# Patient Record
Sex: MALE | Race: BLACK OR AFRICAN AMERICAN | NOT HISPANIC OR LATINO | Employment: FULL TIME | URBAN - METROPOLITAN AREA
[De-identification: names, ages, dates, MRNs, and addresses within clinical notes are randomized per-mention and may not be internally consistent; named-entity substitution may affect disease eponyms.]

---

## 2019-11-13 ENCOUNTER — AMBULATORY - HEALTHEAST (OUTPATIENT)
Dept: INTENSIVE CARE | Facility: CLINIC | Age: 34
End: 2019-11-13

## 2019-11-13 DIAGNOSIS — J18.9 COMMUNITY ACQUIRED PNEUMONIA, BILATERAL: ICD-10-CM

## 2019-11-18 ENCOUNTER — OFFICE VISIT - HEALTHEAST (OUTPATIENT)
Dept: FAMILY MEDICINE | Facility: CLINIC | Age: 34
End: 2019-11-18

## 2019-11-18 DIAGNOSIS — J18.9 COMMUNITY ACQUIRED PNEUMONIA, BILATERAL: ICD-10-CM

## 2019-11-18 DIAGNOSIS — M54.50 MIDLINE LOW BACK PAIN WITHOUT SCIATICA, UNSPECIFIED CHRONICITY: ICD-10-CM

## 2019-11-18 DIAGNOSIS — J45.20 MILD INTERMITTENT REACTIVE AIRWAY DISEASE WITHOUT COMPLICATION: ICD-10-CM

## 2019-11-18 DIAGNOSIS — J18.9 PNEUMONIA OF RIGHT LUNG DUE TO INFECTIOUS ORGANISM, UNSPECIFIED PART OF LUNG: ICD-10-CM

## 2019-11-18 DIAGNOSIS — I10 ESSENTIAL HYPERTENSION, BENIGN: ICD-10-CM

## 2019-11-18 RX ORDER — LISINOPRIL 10 MG/1
10 TABLET ORAL DAILY
Qty: 30 TABLET | Refills: 11 | Status: SHIPPED | OUTPATIENT
Start: 2019-11-18

## 2019-11-18 ASSESSMENT — MIFFLIN-ST. JEOR: SCORE: 2016.09

## 2019-11-19 ENCOUNTER — COMMUNICATION - HEALTHEAST (OUTPATIENT)
Dept: FAMILY MEDICINE | Facility: CLINIC | Age: 34
End: 2019-11-19

## 2019-12-06 ENCOUNTER — OFFICE VISIT - HEALTHEAST (OUTPATIENT)
Dept: PULMONOLOGY | Facility: OTHER | Age: 34
End: 2019-12-06

## 2019-12-06 ENCOUNTER — HOSPITAL ENCOUNTER (OUTPATIENT)
Dept: RADIOLOGY | Facility: HOSPITAL | Age: 34
Discharge: HOME | End: 2019-12-06
Attending: INTERNAL MEDICINE

## 2019-12-06 ENCOUNTER — COMMUNICATION - HEALTHEAST (OUTPATIENT)
Dept: PULMONOLOGY | Facility: OTHER | Age: 34
End: 2019-12-06

## 2019-12-06 DIAGNOSIS — J45.30 MILD PERSISTENT REACTIVE AIRWAY DISEASE WITHOUT COMPLICATION: ICD-10-CM

## 2019-12-06 ASSESSMENT — MIFFLIN-ST. JEOR: SCORE: 2014.45

## 2019-12-15 ENCOUNTER — COMMUNICATION - HEALTHEAST (OUTPATIENT)
Dept: PULMONOLOGY | Facility: OTHER | Age: 34
End: 2019-12-15

## 2019-12-17 ENCOUNTER — AMBULATORY - HEALTHEAST (OUTPATIENT)
Dept: PULMONOLOGY | Facility: OTHER | Age: 34
End: 2019-12-17

## 2019-12-17 DIAGNOSIS — B37.0 THRUSH: ICD-10-CM

## 2019-12-19 ENCOUNTER — OFFICE VISIT - HEALTHEAST (OUTPATIENT)
Dept: FAMILY MEDICINE | Facility: CLINIC | Age: 34
End: 2019-12-19

## 2019-12-19 DIAGNOSIS — K12.2 UVULITIS: ICD-10-CM

## 2019-12-19 ASSESSMENT — MIFFLIN-ST. JEOR: SCORE: 2013.54

## 2020-02-06 ENCOUNTER — OFFICE VISIT - HEALTHEAST (OUTPATIENT)
Dept: PULMONOLOGY | Facility: OTHER | Age: 35
End: 2020-02-06

## 2020-02-06 DIAGNOSIS — J45.30 MILD PERSISTENT ASTHMA WITHOUT COMPLICATION: ICD-10-CM

## 2020-02-06 ASSESSMENT — MIFFLIN-ST. JEOR: SCORE: 2035.76

## 2020-03-11 ENCOUNTER — RECORDS - HEALTHEAST (OUTPATIENT)
Dept: ADMINISTRATIVE | Facility: OTHER | Age: 35
End: 2020-03-11

## 2020-03-11 ENCOUNTER — OFFICE VISIT - HEALTHEAST (OUTPATIENT)
Dept: FAMILY MEDICINE | Facility: CLINIC | Age: 35
End: 2020-03-11

## 2020-03-11 DIAGNOSIS — G44.209 ACUTE NON INTRACTABLE TENSION-TYPE HEADACHE: ICD-10-CM

## 2020-03-11 DIAGNOSIS — I10 ESSENTIAL HYPERTENSION: ICD-10-CM

## 2020-03-11 RX ORDER — ERGOTAMINE TARTRATE AND CAFFEINE 1; 100 MG/1; MG/1
TABLET, FILM COATED ORAL
Qty: 100 TABLET | Refills: 0 | Status: SHIPPED | OUTPATIENT
Start: 2020-03-11 | End: 2021-12-17

## 2020-03-11 RX ORDER — AMLODIPINE BESYLATE 5 MG/1
5 TABLET ORAL DAILY
Qty: 30 TABLET | Refills: 0 | Status: SHIPPED | OUTPATIENT
Start: 2020-03-11 | End: 2021-12-17

## 2020-03-12 ENCOUNTER — OFFICE VISIT - HEALTHEAST (OUTPATIENT)
Dept: FAMILY MEDICINE | Facility: CLINIC | Age: 35
End: 2020-03-12

## 2020-03-12 ENCOUNTER — RECORDS - HEALTHEAST (OUTPATIENT)
Dept: ADMINISTRATIVE | Facility: OTHER | Age: 35
End: 2020-03-12

## 2020-03-12 DIAGNOSIS — G44.029 CHRONIC CLUSTER HEADACHE, NOT INTRACTABLE: ICD-10-CM

## 2020-03-12 DIAGNOSIS — G43.909 MIGRAINE WITHOUT STATUS MIGRAINOSUS, NOT INTRACTABLE, UNSPECIFIED MIGRAINE TYPE: ICD-10-CM

## 2020-03-12 RX ORDER — IBUPROFEN 800 MG/1
800 TABLET, FILM COATED ORAL EVERY 8 HOURS PRN
Qty: 60 TABLET | Refills: 2 | Status: SHIPPED | OUTPATIENT
Start: 2020-03-12 | End: 2021-12-17

## 2020-03-12 RX ORDER — HYDROXYZINE HYDROCHLORIDE 25 MG/1
25 TABLET, FILM COATED ORAL EVERY 6 HOURS PRN
Qty: 30 TABLET | Refills: 1 | Status: SHIPPED | OUTPATIENT
Start: 2020-03-12 | End: 2021-12-17

## 2020-03-12 ASSESSMENT — MIFFLIN-ST. JEOR: SCORE: 2024.41

## 2020-03-20 ENCOUNTER — OFFICE VISIT - HEALTHEAST (OUTPATIENT)
Dept: PULMONOLOGY | Facility: OTHER | Age: 35
End: 2020-03-20

## 2020-08-13 ENCOUNTER — OFFICE VISIT - HEALTHEAST (OUTPATIENT)
Dept: FAMILY MEDICINE | Facility: CLINIC | Age: 35
End: 2020-08-13

## 2020-08-13 DIAGNOSIS — R11.0 NAUSEA: ICD-10-CM

## 2020-08-13 DIAGNOSIS — J02.0 STREPTOCOCCAL PHARYNGITIS: ICD-10-CM

## 2020-08-13 LAB — DEPRECATED S PYO AG THROAT QL EIA: ABNORMAL

## 2020-10-17 ENCOUNTER — OFFICE VISIT - HEALTHEAST (OUTPATIENT)
Dept: FAMILY MEDICINE | Facility: CLINIC | Age: 35
End: 2020-10-17

## 2020-10-17 DIAGNOSIS — F17.200 TOBACCO USE DISORDER: ICD-10-CM

## 2020-10-17 DIAGNOSIS — J02.0 STREP PHARYNGITIS: ICD-10-CM

## 2020-10-17 LAB — DEPRECATED S PYO AG THROAT QL EIA: ABNORMAL

## 2020-10-17 RX ORDER — ACETAMINOPHEN 500 MG
1000 TABLET ORAL EVERY 6 HOURS PRN
Qty: 100 TABLET | Refills: 2 | Status: SHIPPED | OUTPATIENT
Start: 2020-10-17 | End: 2021-12-17

## 2021-01-07 ENCOUNTER — OFFICE VISIT - HEALTHEAST (OUTPATIENT)
Dept: FAMILY MEDICINE | Facility: CLINIC | Age: 36
End: 2021-01-07

## 2021-01-07 DIAGNOSIS — M54.50 ACUTE BILATERAL LOW BACK PAIN WITHOUT SCIATICA: ICD-10-CM

## 2021-01-07 DIAGNOSIS — M54.2 NECK PAIN: ICD-10-CM

## 2021-05-17 ENCOUNTER — OFFICE VISIT - HEALTHEAST (OUTPATIENT)
Dept: FAMILY MEDICINE | Facility: CLINIC | Age: 36
End: 2021-05-17

## 2021-05-17 DIAGNOSIS — S46.811A STRAIN OF RIGHT TRAPEZIUS MUSCLE, INITIAL ENCOUNTER: ICD-10-CM

## 2021-05-17 DIAGNOSIS — M54.2 NECK PAIN: ICD-10-CM

## 2021-05-17 RX ORDER — CYCLOBENZAPRINE HCL 5 MG
5 TABLET ORAL 3 TIMES DAILY PRN
Qty: 30 TABLET | Refills: 0 | Status: SHIPPED | OUTPATIENT
Start: 2021-05-17 | End: 2021-12-17

## 2021-06-03 VITALS
DIASTOLIC BLOOD PRESSURE: 80 MMHG | WEIGHT: 220.56 LBS | SYSTOLIC BLOOD PRESSURE: 138 MMHG | HEIGHT: 75 IN | BODY MASS INDEX: 27.42 KG/M2

## 2021-06-03 NOTE — PROGRESS NOTES
Hospital Follow-up Visit:    Assessment/Plan:     1. Pneumonia of right lung due to infectious organism, unspecified part of lung    - predniSONE (DELTASONE) 10 mg tablet; Take 10 mg by mouth daily 3 tablets by mouth daily for 2 days, then take 2 tablets for 2 days, then 1 tablet for 2 days..  Dispense: 12 tablet; Refill: 0  - XR Chest 2 Views  - Ambulatory referral to Pulmonology  Use albuterol every 6 hours, drink plenty of fluids, humidifier at night, Mucinex, shower steam twice daily.  Continue to work on nicotine weaning.    2. Mild intermittent reactive airway disease without complication  Discussed his inhalers, he is using this 1 twice daily and albuterol as his rescue inhaler.  Discussed the use of omeprazole on steroids to reduce risk of ulcers.  - fluticasone propionate (FLOVENT HFA) 44 mcg/actuation inhaler; Inhale 1 puff 2 (two) times a day.  Dispense: 1 Inhaler; Refill: 12  - omeprazole (PRILOSEC) 40 MG capsule; Take 1 capsule (40 mg total) by mouth daily before breakfast.  Dispense: 5 capsule; Refill: 0  - Ambulatory referral to Pulmonology      4. Midline low back pain without sciatica, unspecified chronicity  Continue to use over-the-counter lidocaine patches.  He states that chiropractic adjustments have worked well for him in the past for his low back pain.  - Ambulatory referral to Chiropractic    5. Essential hypertension, benign  We will switch to lisinopril since he did not tolerate it.  States that he may have had an episode of gout while on this medicine.  Follow-up in 1 month to recheck.  - lisinopril (PRINIVIL,ZESTRIL) 10 MG tablet; Take 1 tablet (10 mg total) by mouth daily.  Dispense: 30 tablet; Refill: 11        Subjective:     Blake Degroot is a 34 y.o. male who presents for a hospital discharge follow up.  He states that he is using his albuterol about once daily, using his Flovent twice daily.  He is still having a lot of shortness of breath which is dry.  He has not had fever but  he has had sweats.  He continues to smoke but has reduced his cigarettes down to about 6 daily.  He did not get a pneumonia shot while he was in the hospital.  ROS: Patient denies fever, chills, sweats, fainting, fatigue, weight change, dizziness, sleep problems, chest pain, palpitations, shortness of breath, wheezing, cough,  sore throat, changes in hearing, ear pain,tinnitus,  disphagia, sore throat, globus, changes in vision, eye pain eye redness, acid reflux, nausea, vomiting, diarrhea, constipation, black or bloody stools,  Dysuria, frequency, urinary incontinence, nocturia, hematuria, back pain,joint pain, bone pain, muscle cramps,edema, weakness, numbness, tingling of extremities, rash, itching, skin changes, swollen lymph nodes, thirst, increased urination, breast lumps, breast pain, nipple discharge, memory difficulties, anxiety, mood swings, (female)vaginal discharge, dyspareunia, menorrhagia, pelvic pain, sexual dysfunction,   (male) testicular lumps, erectile dysfunction.        Hospital/Nursing Home/ Rehab Facility: [facility]  Date of Admission: 11/12/19  Date of Discharge: 11/13/2019  Reason(s) for Admission: Pneumonia            Do you have any problems taking your medication regularly?  None       Have you had any changes in your medication since discharge?  Needs to restart blood pressure medication, had been off of hydrochlorothiazide and did have some elevated blood pressures while in the hospital.       Have you had any difficulty following your discharge or treatment plan?  No    Summary of hospitalization:  Hospital discharge summary reviewed  Diagnostic Tests/Treatments reviewed.  Follow up needed: Pulmonology  Other Healthcare Providers Involved in Patient's Care: Patient Care Team:  Maria Alejandra Rich PA-C as PCP - General (Physician Assistant)      Update since discharge: {improved   Information from family, SNF, care coordination: None needed    Post Discharge Medication  "Reconciliation: discharge medications reconciled and changed, per note/orders (see AVS)  Plan of care communicated with: patient and family    Objective:     Vitals:    11/18/19 0835   BP: 138/80   Weight: 220 lb 9 oz (100 kg)   Height: 6' 3\" (1.905 m)         Physical Exam:  Alert, cooperative, well-hydrated.  Appears well.  Eyes: Pupils equal, round, reactive to light.  HEENT: Sclera white, nares patent, MMM   Lungs: Mild expiratory wheezes, scattered rales. No retractions, no increased work of respiration, equal chest rise.   Heart: Regular rate and rhythm, no murmurs, clicks,    Gallops.  Abdomen: Soft, bowel sounds in 4 quadrants with no tenderness to palpation, no organomegaly or masses, no aortic or renal bruits.  Extremities: no tenderness to palpation of gastrocnemius, bilaterally.  Skin: no increased warmth, edema, or erythema of lower legs bilaterally.  Back:  No cervical, thoracic or lumbar tenderness to spinous processes or musculature.        Electronically signed by Maria Alejandra Rich PA-C 11/18/19 12:08 PM   "

## 2021-06-03 NOTE — PATIENT INSTRUCTIONS - HE
Continue to nicotine wean.  Nasal saline.  Mucinex.  2. Humidifier, push fluids, rest,Steam in shower twice daily, 10 mins.  Albuterol every 6 hours.  Follow up with Pulmonology.

## 2021-06-03 NOTE — TELEPHONE ENCOUNTER
----- Message from Maria Alejandra Rich PA-C sent at 11/18/2019 10:26 AM CST -----  CXR is now normal, please call results to the patient or send a letter if not reachable by phone.

## 2021-06-03 NOTE — TELEPHONE ENCOUNTER
Patient Returning Call  Reason for call:  Patient called back.  Information relayed to patient:  Informed of Maria Alejandra Stevens PA-C's message listed below.  Patient states understanding.  Patient has additional questions:  No  If YES, what are your questions/concerns:    Okay to leave a detailed message?: No call back needed

## 2021-06-04 VITALS
SYSTOLIC BLOOD PRESSURE: 124 MMHG | OXYGEN SATURATION: 98 % | WEIGHT: 220.2 LBS | BODY MASS INDEX: 27.38 KG/M2 | RESPIRATION RATE: 24 BRPM | HEART RATE: 92 BPM | HEIGHT: 75 IN | DIASTOLIC BLOOD PRESSURE: 74 MMHG

## 2021-06-04 VITALS
DIASTOLIC BLOOD PRESSURE: 70 MMHG | BODY MASS INDEX: 27.35 KG/M2 | WEIGHT: 220 LBS | HEIGHT: 75 IN | SYSTOLIC BLOOD PRESSURE: 130 MMHG

## 2021-06-04 VITALS — BODY MASS INDEX: 27.02 KG/M2 | WEIGHT: 222 LBS

## 2021-06-04 NOTE — PROGRESS NOTES
CCx:FU Hospitalization after admission for BL Pneumonia    HPI:Mr. Degroot is a 34 y.o.year-old gentleman with a past medical history significant for hypertension, depression and anxiety who presented to the emergency room on 11/12 for evaluation of a worsening cough.  He was seen in the emergency room on 11/10 for similar complaint with sore throat, fever, nausea vomiting accompanied with diarrhea and a headache.  While in the emergency room he was treated with inhaled bronchodilators, administered a one-time dose of Rocephin, steroids, IV fluids and morphine.  It is noted that he was advised hospitalization but declined this and was sent home with a 10-day course of doxycycline.    He represented a day later and was admitted to the hospital because of worsening symptoms and treated with IV antibiotics and steroids with some improvement in his symptoms.  He underwent a follow-up chest x-ray after discharge which demonstrated significant tearing of his infiltrates but today he tells me that he has been feeling unwell again and has documented a fever of 100.5 associated with left-sided chest discomfort and yellow productive cough.  He has had several nighttime awakenings with coughing over the last week.  He states that he is continuing to smoke 5 to 6 cigarettes a day although his girlfriend who is here with him thinks that he smokes about a pack and a half a day and also continues to be compliant with his Flovent and albuterol inhalers.  ROS:  Pertinent positives alluded to in the HPI. Remainder of 10 point ROS is negative.     PMH:  1. HTN  2. Depression  3. Anxiety    Allergies:  Reviewed in epic    Family HX:  No family history on file.    Social Hx:  Social History     Socioeconomic History     Marital status:      Spouse name: None     Number of children: None     Years of education: None     Highest education level: None   Occupational History     None   Social Needs     Financial resource strain: None  "    Food insecurity:     Worry: None     Inability: None     Transportation needs:     Medical: None     Non-medical: None   Tobacco Use     Smoking status: Current Every Day Smoker     Packs/day: 0.00     Smokeless tobacco: Never Used   Substance and Sexual Activity     Alcohol use: Yes     Comment: occasional     Drug use: No     Sexual activity: None   Lifestyle     Physical activity:     Days per week: None     Minutes per session: None     Stress: None   Relationships     Social connections:     Talks on phone: None     Gets together: None     Attends Taoist service: None     Active member of club or organization: None     Attends meetings of clubs or organizations: None     Relationship status: None     Intimate partner violence:     Fear of current or ex partner: None     Emotionally abused: None     Physically abused: None     Forced sexual activity: None   Other Topics Concern     None   Social History Narrative     None     Current Meds:  Current Outpatient Medications   Medication Sig Dispense Refill     albuterol (PROAIR HFA;PROVENTIL HFA;VENTOLIN HFA) 90 mcg/actuation inhaler Inhale 2 puffs every 4 (four) hours as needed for wheezing. 1 Inhaler 0     fluticasone propionate (FLOVENT HFA) 44 mcg/actuation inhaler Inhale 1 puff 2 (two) times a day. 1 Inhaler 12     lisinopril (PRINIVIL,ZESTRIL) 10 MG tablet Take 1 tablet (10 mg total) by mouth daily. 30 tablet 11     No current facility-administered medications for this visit.      Labs:  Reviewed.    Imaging studies:  CXR 11/12/19:  Interval clearing of multifocal bronchopneumonia. Lungs are clear. Lungs are clear. No effusion. Heart and pulmonary vascularity are normal.     Physical Exam:  /74   Pulse 92   Resp 24   Ht 6' 3\" (1.905 m)   Wt 220 lb 3.2 oz (99.9 kg)   SpO2 98% Comment: RA  BMI 27.52 kg/m    Heart Rate: 92  Resp: 24  BP: 124/74  SpO2: 98 % (RA)  Height: 6' 3\" (1.905 m)  Weight: 220 lb 3.2 oz (99.9 kg)  Physical Exam "   Constitutional: He is oriented to person, place, and time. He appears well-developed and well-nourished.   HENT:   Head: Normocephalic.   Poor oral hygiene.   Eyes: Pupils are equal, round, and reactive to light. Conjunctivae are normal.   Neck: Normal range of motion.   Cardiovascular: Normal rate and regular rhythm.   Pulmonary/Chest: Effort normal. He has no wheezes. He has no rales. He exhibits no tenderness.   Diminished BS at the bases BL.   Abdominal: Soft.   Musculoskeletal: Normal range of motion.         General: No edema.   Neurological: He is alert and oriented to person, place, and time.   Skin: Skin is warm.   Psychiatric: He has a normal mood and affect.       Assessment and Plan:Blake Degroot is a 34 y.o. with a past medical history significant for HTN, depression and anxiety who presents to clinic today for follow-up visit after recent hospitalization for bilateral pneumonia with respiratory failure.  Follow-up imaging had significantly cleared, however, he describes having ongoing new fevers, productive cough and pain making wonder if he has had another pneumonia.  For the present we would recommend;    1. ?  Recurrent pneumonia: As above.    We will initiate a 5-day course of doxycycline and prednisone 40 mg daily.    Meprazole 40 mg daily.    Chest x-ray AP and lateral.    Advised that the patient continues fluticasone 2 puffs daily and reminded him to gargle after using this.    For rescue.  I provided him with a spacer.  2. HCM: I spent greater than 5 and less than 10 minutes counseling about tobacco cessation.  3. Follow-up: 2 months.      Radha Diaz  Pulmonary and Critical Care  [id number]

## 2021-06-04 NOTE — PROGRESS NOTES
"Clinic Note   SUBJECTIVE:   Chief Complaint   Patient presents with     Follow-up   Blake Degroot is seen for now 1 month of coughing, congestion, he is also had voice loss in the last 2 weeks.  He continues to have sore throat that is worse with swallowing in the last 2 weeks also.  He is no longer using steroid inhaler is not.  He did see the pulmonologist and was on oral steroids for a time..  His chest x-ray at last check showed asthma only.  He was advised to continue his Flovent and albuterol at his pulmonary appointment but he did not feel the need to continue the Flovent.  He is only using the albuterol occasionally.  He continues to smoke but only 2 cigarettes a day.  He denies any other substance use.  Review of systems: Negative for chest chest pain, positive for rib pain with coughing, shortness of breath, wheezing, negative for fever, nausea, vomiting.  Allergies   Allergen Reactions     Amitriptyline Unknown     Ibuprofen Unknown     Imitrex [Sumatriptan Succinate] Unknown     OBJECTIVE:   /70 (Patient Site: Right Arm, Patient Position: Sitting, Cuff Size: Adult Regular)   Ht 6' 3\" (1.905 m)   Wt 220 lb (99.8 kg)   BMI 27.50 kg/m     General: Alert, oriented, fatigued appearing.  HEENT: Bilateral ear exam showed no inflammation of tympanic membranes or   canals. Nose exam: No discharge. Sinuses nontender on palpation. Oral   exam: The patient had moderate erythema, inflammation of oropharynx.  Uvula is swollen and injected.  NECK: Supple, mildly tender and bilateral anterior cervical lymphadenopathy.   LUNGS: Clear to auscultation.  Equal chest rise, no retractions, light expiratory wheezes heard in upper lobes.  CARDIOVASCULAR: S1, S2, regular rate and rhythm, no murmur.   ASSESSMENT:  1. Acute streptococcal pharyngitis     2. Uvulitis  Ambulatory referral to ENT    fluconazole (DIFLUCAN) 150 MG tablet    clindamycin (CLEOCIN) 300 MG capsule    DISCONTINUED: clindamycin (CLEOCIN) 300 MG " capsule     PLAN:   Maria Alejandra Rich, MS, PA-C

## 2021-06-04 NOTE — PATIENT INSTRUCTIONS - HE
1. Please do not lie down for at least 2 hours after your meals.  2. Please elevate the head end of your bed when you sleep, you can do this by placing a thick book under the head post.  3. Please use your Fluticasone inhaler twice a day every day whether or not you are short of breath, this is not a rescue inhaler so do not use this if you are acutely short of breath.  4. Please be sure to gargle your mouth after using your Fluticasone inhaler.   5. Please use the spacer that I have provided you to use your inhalers.  6. Please use your albuterol inhaler 2 puffs up to 6 times a day for rescue. If you are not short of breath, coughing or wheezing you do not need to use this.  7. I will give you a prescription for Doxycycline and prednisone which I want you to take for 5 days.  8. I will start you on Omeprazole which I would like you to take once a day.  9. I will email/call you with the results of your chest XR.  10. PLEASE STOP SMOKING.

## 2021-06-04 NOTE — PATIENT INSTRUCTIONS - HE
Try to quit smoking, down to 2 cigarettes a day.  Use albuterol up to every 6 hours as needed for wheezing.  Follow-up with ear nose and throat if symptoms do not improve.

## 2021-06-05 NOTE — PROGRESS NOTES
CCx:FU asthma exacerbation    HPI:Mr. Degroot is a 34 y.o.year-old gentleman with a past medical history significant for hypertension, depression and anxiety who presented to the emergency room on 11/12 for evaluation of a worsening cough.    He was last seen by me at the end of December as a hospital follow-up when he had been treated for bacterial pneumonia and with steroids for presumed asthma exacerbation.  He tells me that he has been experiencing on and off fevers and endorses a cough with shortness of breath particularly at night that wakes him up from sleep.  He had been well since the last time he had seen me and describes the symptoms started about 5 days ago fairly abruptly.  He has not been using any inhalers as these ran out.  He has been on lisinopril since November and did not have a cough associated with this and denies any wheezing.  He also endorses ongoing left chest wall pain.    ROS:  Pertinent positives alluded to in the HPI. Remainder of 10 point ROS is negative.     PMH (Unchanged from previous visit):  1. HTN  2. Depression  3. Anxiety    Allergies  (Unchanged from previous visit):  Reviewed in [facility]    Family HX (Unchanged from previous visit):  No family history on file.    Social Hx (Unchanged from previous visit):  Social History     Socioeconomic History     Marital status:      Spouse name: None     Number of children: None     Years of education: None     Highest education level: None   Occupational History     None   Social Needs     Financial resource strain: None     Food insecurity:     Worry: None     Inability: None     Transportation needs:     Medical: None     Non-medical: None   Tobacco Use     Smoking status: Current Every Day Smoker     Packs/day: 0.00     Smokeless tobacco: Never Used   Substance and Sexual Activity     Alcohol use: Yes     Comment: occasional     Drug use: No     Sexual activity: None   Lifestyle     Physical activity:     Days per week: None      "Minutes per session: None     Stress: None   Relationships     Social connections:     Talks on phone: None     Gets together: None     Attends [employer] service: None     Active member of club or organization: None     Attends meetings of clubs or organizations: None     Relationship status: None     Intimate partner violence:     Fear of current or ex partner: None     Emotionally abused: None     Physically abused: None     Forced sexual activity: None   Other Topics Concern     None   Social History Narrative     None     Current Meds:  Current Outpatient Medications   Medication Sig Dispense Refill     albuterol (PROAIR HFA;PROVENTIL HFA;VENTOLIN HFA) 90 mcg/actuation inhaler Inhale 2 puffs every 4 (four) hours as needed for wheezing. 1 Inhaler 0     lisinopril (PRINIVIL,ZESTRIL) 10 MG tablet Take 1 tablet (10 mg total) by mouth daily. 30 tablet 11     No current facility-administered medications for this visit.      Labs:  Reviewed.    Imaging studies (Unchanged from previous visit):  CXR 11/12/19:  Interval clearing of multifocal bronchopneumonia. Lungs are clear. Lungs are clear. No effusion. Heart and pulmonary vascularity are normal.     Physical Exam:  /82   Pulse 76   Temp 98.6  F (37  C)   Resp 18   Ht 6' 3\" (1.905 m)   Wt (!) 226 lb (102.5 kg)   SpO2 100%   BMI 28.25 kg/m    Temp: 98.6  F (37  C)  Heart Rate: 76  Resp: 18  BP: 138/82  SpO2: 100 %  Height: 6' 3\" (1.905 m)  Weight: (!) 226 lb (102.5 kg)  Physical Exam   Constitutional: He is oriented to person, place, and time. He appears well-developed and well-nourished.   HENT:   Head: Normocephalic.   Poor oral hygiene.   Eyes: Pupils are equal, round, and reactive to light. Conjunctivae are normal.   Neck: Normal range of motion.   Cardiovascular: Normal rate and regular rhythm.   Pulmonary/Chest: Effort normal. He has no wheezes. He has no rales. He exhibits no tenderness.   Diminished BS at the bases BL.   Abdominal: Soft. "   Musculoskeletal: Normal range of motion.         General: No edema.   Neurological: He is alert and oriented to person, place, and time.   Skin: Skin is warm.   Psychiatric: He has a normal mood and affect.       Assessment and Plan:Blake Degroot is a 34 y.o. with a past medical history significant for HTN, depression and anxiety who presents to clinic today for follow-up visit for likely asthma exacerbation.  For the present we would recommend;    1. Asthma: Has had 2 courses of prednisone over the last couple of months and states that his shortness of breath and coughing presently does not feel so bad as to warrant initiating a steroid burst.    I reminded him to initiate fluticasone 2 puffs twice a day as I suspect that he will require this for better control of his symptoms.  Been shown proper inhaler technique and instructed to gargle after using this.    Omeprazole 40 mg daily.    As needed albuterol for rescue.   2. HCM:     I spent greater than 5 and less than 10 minutes counseling about tobacco cessation.    Discussed the need for better oral hygiene to prevent lung infections.  3. Follow-up: 2 months.      Radha Diaz  Pulmonary and Critical Care  [id number]

## 2021-06-05 NOTE — PATIENT INSTRUCTIONS - HE
1. Please do not lie down for at least 2 hours after your meals.  2. Please elevate the head end of your bed when you sleep, you can do this by placing a thick book under the head post.  3. Please use your Fluticasone inhaler twice a day every day whether or not you are  short of breath, this is not a rescue inhaler so do not use this if you are acutely short of breath.  4. Please be sure to gargle your mouth after using your Fluticasone inhaler.  5. Please use your albuterol inhaler 2 puffs up to 6 times a day for rescue. If you are not short of breath, you do not need to use this.  6. Please be sure to use your Omeprazole 40mg daily as acid reflux can certainly worsen symptoms of asthma.

## 2021-06-06 NOTE — PROGRESS NOTES
ER Follow-up Visit:    Assessment/Plan:     1. Chronic cluster headache, not intractable       Start Ibuprofen 800 mg with food for any new headache and take hydroxyzine 25 mg with each dose. Follow up in 2 weeks for recheck.         Subjective:     Blake Degroot is a 34 y.o. male who presents for a hospital discharge follow up. He was seen 2 times in the last week in the ER for Migraine headaches. He was given Benadryl and compazine and headaches resolved but then returned the next day. He has a history of severe headaches with nausea and vomiting. He has had daily headaches this week and this is new. Due to this worsening pattern he had a CTA and this showed no evidence of acute hemorrhage or abnormality. He has an unchanged 2 mm outpouching of the supraclinoid ICA since last imaging on 7/17/09. He was advised that he needs a Neurology consult.  He has continued on Norvasc and Propranolol and these are not improving his headaches. He was given a prescription for Cafergot but it was not available at the pharmacy. He is currently not taking any other medication for pain. He can not take Immitrex or Amitriptyline. Ibuprofen causes upset stomach but he can take this with food.       Hospital/Nursing Home/IP Rehab Facility: [facility]  Date of Admission: ER only  Date of Discharge:3/7/20; 3/10/20  Reason(s) for Admission:ER only, Migraine headache, cluster            Do you have any problems taking your medication regularly?  None       Have you had any changes in your medication since discharge? None       Have you had any difficulty following your discharge or treatment plan?  No    Summary of hospitalization:  ER visits reviewed  Diagnostic Tests/Treatments reviewed.  Follow up needed: Medication changes requested bu patient. Still having daily headaches and difficulty sleeping.   Other Healthcare Providers Involved in Patient's Care: Patient Care Team:  Maria Alejandra Rich PA-C as PCP - General  "(Physician Assistant)  Maria Alejandra Rich PA-C as Assigned PCP      Update since discharge:    Information from family, SNF, care coordination: none     Post Discharge Medication Reconciliation: discharge medications reconciled, continue medications without change  Plan of care communicated with: patient    Objective:     Vitals:    03/12/20 1411   BP: 120/70   Weight: 220 lb (99.8 kg)   Height: 6' 4\" (1.93 m)         Physical Exam:  Alert, cooperative, well-hydrated.  Appears well.  Eyes: Pupils equal, round, reactive to light.  HEENT: Sclera white, nares patent, MMM   Lungs: Clear to auscultation. No retractions, no increased work of respiration, equal chest rise.   Heart: Regular rate and rhythm, no murmurs, clicks,    Gallops.            Coding guidelines for this visit:  Type of Medical   Decision Making Face-to-Face Visit       within 7 Days of discharge Face-to-Face Visit        within 14 days of discharge   Moderate Complexity [id number]   High Complexity [id number]       Electronically signed by Maria Alejandra Rich PA-C 03/15/20 2:55 PM     "

## 2021-06-06 NOTE — PROGRESS NOTES
Assessment:   1. Acute non intractable tension-type headache  Discussed diagnosis and treatment options with patient including continuing current medications.   - ergotamine-caffeine (CAFERGOT) 1-100 mg per tablet; Two tablets at onset of attack; then 1 tablet every 30 minutes as needed; maximum: 6 tablets per attack; do not exceed 10 tablets/week  Dispense: 100 tablet; Refill: 0    2. Essential hypertension  Discussed need to properly manage his blood pressure and to take medications as prescribed. Recommend that he follow up with his PCP for blood pressure recheck.   - amLODIPine (NORVASC) 5 MG tablet; Take 1 tablet (5 mg total) by mouth daily.  Dispense: 30 tablet; Refill: 0     Plan:   Continue present treatment and plan.  Lie in darkened room and apply cold packs as needed for pain.  Episodic therapy: ergotamines due to low frequency of pain.  Prophylactic therapy: prophylaxis with beta blockers (no history of asthma) due to high frequency of pain.  Side effect profile discussed in detail.  Asked to keep headache diary.  Avoid alcohol, aspirin and OTC NSAIDs, excessive physical activity, smoking, spicy foods and stressful situations as much as possible.  Referral to Neurology.  Follow up in 7 days with his PCP    Subjective:   Blake Degroot is a 34 y.o. male who presents for evaluation of headache. Symptoms began about 7 days ago. He reports that he use to have headaches in the past and for over a year he did not have any until recently. Generally, the headaches is ongoing and has not stopped since it started. He reports that he was at the ED on 3/7/2020 and yesterday and this morning he has been to the urgency room for the same reason as his headaches has not gotten better. He reports that he was given ketorolac the ED ad it did not help. He reports that he is allergic to Imitrex. He states that nothing has worked and he is scheduled to see a neurologist on 3/23/2020. He had a CT of the head and it was  unremarkable. . Patient has no other concern except that his blood pressure was elevated while he was in the ED. He denied dizziness, and confusion.     The following portions of the patient's history were reviewed and updated as appropriate: allergies, current medications, past family history, past medical history, past social history, past surgical history and problem list.    Review of Systems  A 12 point comprehensive review of systems was negative except as noted.      Objective:   BP (!) 156/95   Pulse 86   Wt (!) 224 lb 4 oz (101.7 kg)   SpO2 100%   BMI 27.30 kg/m    General appearance: alert, appears stated age and cooperative  Head: Normocephalic, without obvious abnormality, atraumatic  Pulses: 2+ and symmetric  Skin: Skin color, texture, turgor normal. No rashes or lesions  Lymph nodes: Cervical, supraclavicular, and axillary nodes normal.  Neurologic: Grossly normal      I reviewed Head CT results performed at the ED and confirmed:   1.  No evidence of proximal large vessel occlusion or flow-limiting stenosis.  2.  Laterally directed 2 mm outpouching arising from the right supraclinoid ICA. This may represent a small infundibulum or aneurysm. It appears essentially stable from 07/17/2009.

## 2021-06-12 NOTE — PROGRESS NOTES
Chief Complaint   Patient presents with     Cough     X1 week. Denies any fever.      Sore Throat     X3 days      Blood pressure 148/89, pulse 95, temperature 98.5  F (36.9  C), temperature source Oral, resp. rate 18, SpO2 98 %.         JULIANO Lozano is a 35 y.o.  male with a PMH significant for:     Patient Active Problem List   Diagnosis     Essential hypertension     Headache     Tobacco use disorder     Community acquired pneumonia, bilateral     Pneumonia     Mild intermittent reactive airway disease without complication     Patient reports nonproductive, dry cough for the past week, and now with sore throat for the past 3 days.  Last evaluated on 8/13/2020 and diagnosed with strep pharyngitis.  Patient denies any fevers, shortness of breath, wheezing, nausea, vomiting, chest pain.  Denies any sick contacts or recent travels.    PMH, Medications and Allergies were reviewed and updated as needed.        REVIEW OF SYSTEMS     Pertinent items are noted in HPI.        OBJECTIVE     Vitals:    10/17/20 0825   BP: 148/89   Patient Site: Right Arm   Patient Position: Sitting   Cuff Size: Adult Regular   Pulse: 95   Resp: 18   Temp: 98.5  F (36.9  C)   TempSrc: Oral   SpO2: 98%     There is no height or weight on file to calculate BMI.    Constitutional: Awake, alert, cooperative, no acute distress, and appears stated age.  Eyes: sclera clear, conjunctiva normal.  ENT: NC/AT, MMM, tonsils mildly erythematous without any abscess or trismus appreciated  Neck: Supple, symmetrical, trachea midline, tenderness to palpation at the right submandibular lymph nodes  Back: Symmetric, no curvature  Lungs: No increased WOB, CTABL, no crackles or wheezing appreciated.  Cardiovascular: Appears well perfused, RRR, normal S1 and S2, no S3 or S4, and no murmur appreciated.  Neurologic: Cranial nerves II-XII are grossly intact.  Sensory is intact. Gait is normal.  Neuropsychiatric: Normal affect, mood, orientation, memory  and insight.  Skin: No visible rashes, erythema, pallor, petechia or purpura.    Pertinent Labs  Recent Results (from the past 24 hour(s))   Rapid Strep A Screen-Throat swab    Specimen: Throat   Result Value Ref Range    Rapid Strep A Antigen Group A Strep detected (!) No Group A Strep detected, presumptive negative     Lab Results: personally reviewed.       ASSESSMENT AND PLAN     Blake was seen today for cough and sore throat.    Diagnoses and all orders for this visit:    Strep pharyngitis  -     Rapid Strep A Screen-Throat swab  -     acetaminophen (TYLENOL EXTRA STRENGTH) 500 MG tablet; Take 2 tablets (1,000 mg total) by mouth every 6 (six) hours as needed for pain.  -     amoxicillin (AMOXIL) 500 MG capsule; Take 1 capsule (500 mg total) by mouth 2 (two) times a day for 10 days.    Tobacco use disorder  Patient not interested in quitting smoking at this time.      Ismael Snowden  10/17/2020

## 2021-06-14 NOTE — PROGRESS NOTES
URGENT CARE VISIT:    SUBJECTIVE:   Blake Degroot is a 35 y.o. male who presents for neck and back pain for 1 day. A heavy and big metal shelf fell on him and his coworker. He does not want to file as work comp. Pain is constant and sharp. Symptoms have been stable since onset. Denies n/t or incontinence. Treatments tried include ice with some relief of symptoms.    PMH:   Past Medical History:   Diagnosis Date     Anxiety      Depression      Hypertension      Allergies: Amitriptyline, Ibuprofen, and Imitrex [sumatriptan succinate]  Medications:   Current Outpatient Medications   Medication Sig Dispense Refill     acetaminophen (TYLENOL EXTRA STRENGTH) 500 MG tablet Take 2 tablets (1,000 mg total) by mouth every 6 (six) hours as needed for pain. 100 tablet 2     albuterol (PROAIR HFA;PROVENTIL HFA;VENTOLIN HFA) 90 mcg/actuation inhaler Inhale 2 puffs every 4 (four) hours as needed for wheezing. 1 Inhaler 0     amLODIPine (NORVASC) 5 MG tablet Take 1 tablet (5 mg total) by mouth daily. 30 tablet 0     cyclobenzaprine (FLEXERIL) 10 MG tablet Take 1 tablet (10 mg total) by mouth at bedtime as needed for muscle spasms. 5 tablet 0     ergotamine-caffeine (CAFERGOT) 1-100 mg per tablet Two tablets at onset of attack; then 1 tablet every 30 minutes as needed; maximum: 6 tablets per attack; do not exceed 10 tablets/week 100 tablet 0     fluticasone propionate (FLOVENT HFA) 44 mcg/actuation inhaler Inhale 1 puff 2 (two) times a day. 1 Inhaler 12     hydrOXYzine HCL (ATARAX) 25 MG tablet Take 1 tablet (25 mg total) by mouth every 6 (six) hours as needed for itching. 30 tablet 1     ibuprofen (ADVIL,MOTRIN) 800 MG tablet Take 1 tablet (800 mg total) by mouth every 8 (eight) hours as needed for pain. 60 tablet 2     lisinopril (PRINIVIL,ZESTRIL) 10 MG tablet Take 1 tablet (10 mg total) by mouth daily. 30 tablet 11     propranoloL (INDERAL) 20 MG tablet Take 1 tablet (20 mg total) by mouth 2 (two) times a day. 60 tablet 0      No current facility-administered medications for this visit.      Social History:   Social History     Tobacco Use     Smoking status: Current Every Day Smoker     Packs/day: 0.00     Smokeless tobacco: Never Used   Substance Use Topics     Alcohol use: Yes     Comment: occasional       ROS: ROS otherwise found to be negative except as noted above.     OBJECTIVE:  /82 (Patient Site: Right Arm, Patient Position: Sitting, Cuff Size: Adult Regular)   Pulse 86   Temp 98.8  F (37.1  C) (Oral)   Resp 16   Wt 210 lb 3.2 oz (95.3 kg)   SpO2 100%   BMI 25.59 kg/m    General: WDWN in NAD  Eyes: EOMI,  PERRL, conjunctiva clear  Neck: Supple, mild paracervical TTP. FROM. Pain with lateral bending.   Cardiac: RRR without murmurs, rubs, or gallops.  Respiratory: LCTAB without adventitious sounds. Non-labored breathing.  Extremities: No peripheral edema or tenderness, peripheral pulses normal  Musculoskeletal: Gait normal. No gross deformities noted, diffuse mild TTP over T3 to 5 and L1 to L3. FROM. Pain with bending. Negative straight leg test. Able to walk on heels and toes. Mild TTP over right lateral hip. FROM right hip.   Neuro: 5/5 bilateral hip strength   Integumentary: No suspicious rashes or lesions.     ASSESSMENT:   1. Acute bilateral low back pain without sciatica  cyclobenzaprine (FLEXERIL) 10 MG tablet   2. Neck pain          PLAN:  Patient Instructions   Patient was educated on the natural course of injury.  Take medication as directed. Side effects may include drowsiness. Conservative measures discussed including rest, ice, and over-the-counter analgesics (Tylenol or Ibuprofen) as needed. See your primary care provider if symptoms worsen or do not improve in 7 days. Seek emergency care if you develop severe pain/swelling, inability to move extremity, skin paleness, or weakness.     Patient verbalized understanding and is agreeable to plan. The patient was discharged ambulatory and in stable  condition.    Arlene Galindo ....................  1/7/2021   4:25 PM

## 2021-06-14 NOTE — PATIENT INSTRUCTIONS - HE
Patient was educated on the natural course of injury.  Take medication as directed. Side effects may include drowsiness. Conservative measures discussed including rest, ice, and over-the-counter analgesics (Tylenol or Ibuprofen) as needed. See your primary care provider if symptoms worsen or do not improve in 7 days. Seek emergency care if you develop severe pain/swelling, inability to move extremity, skin paleness, or weakness.

## 2021-06-15 ENCOUNTER — OFFICE VISIT - HEALTHEAST (OUTPATIENT)
Dept: FAMILY MEDICINE | Facility: CLINIC | Age: 36
End: 2021-06-15

## 2021-06-15 DIAGNOSIS — L98.9 SKIN LESION: ICD-10-CM

## 2021-06-15 ASSESSMENT — MIFFLIN-ST. JEOR: SCORE: 1977.69

## 2021-06-16 PROBLEM — J18.9 COMMUNITY ACQUIRED PNEUMONIA, BILATERAL: Status: ACTIVE | Noted: 2019-11-12

## 2021-06-16 PROBLEM — J18.9 PNEUMONIA: Status: ACTIVE | Noted: 2019-11-12

## 2021-06-18 NOTE — PATIENT INSTRUCTIONS - HE
Patient Instructions by Ismael Snowden MD at 10/17/2020  8:20 AM     Author: Ismael Snowden MD Service: -- Author Type: Physician    Filed: 10/17/2020  8:51 AM Encounter Date: 10/17/2020 Status: Signed    : Ismael Snowden MD (Physician)         Patient Education     Pharyngitis: Strep (Confirmed)    You have had a positive test for strep throat. Strep throat is a contagious illness. It is spread by coughing, kissing or by touching others after touching your mouth or nose. Symptoms include throat pain that is worse with swallowing, aching all over, headache, and fever. It is treated with antibiotic medicine. This should help you start to feel better in 1 to 2 days.  Home care    Rest at home. Drink plenty of fluids to you won't get dehydrated.    No work or school for the first 2 days of taking the antibiotics. After this time, you will not be contagious. You can then return to school or work if you are feeling better.     Take antibiotic medicine for the full 10 days, even if you feel better. This is very important to ensure the infection is treated. It is also important to prevent medicine-resistant germs from developing. If you were given an antibiotic shot, you don't need any more antibiotics.    You may use acetaminophen or ibuprofen to control pain or fever, unless another medicine was prescribed for this. Talk with your healthcare provider before taking these medicines if you have chronic liver or kidney disease. Also talk with your healthcare provider if you have had a stomach ulcer or GI bleeding.    Throat lozenges or sprays help reduce pain. Gargling with warm saltwater will also reduce throat pain. Dissolve 1/2 teaspoon of salt in 1 glass of warm water. This may be useful just before meals.     Soft foods are OK. Don't eat salty or spicy foods.  Follow-up care  Follow up with your healthcare provider or our staff if you don't get better over the next week.  When to seek medical advice  Call your healthcare  provider right away if any of these occur:    Fever of 100.4 F (38 C) or higher, or as directed by your healthcare provider    New or worsening ear pain, sinus pain, or headache    Painful lumps in the back of neck    Stiff neck    Lymph nodes getting larger or becoming soft in the middle    You can't swallow liquids or you can't open your mouth wide because of throat pain    Signs of dehydration. These include very dark urine or no urine, sunken eyes, and dizziness.    Trouble breathing or noisy breathing    Muffled voice    Rash  Prevention  Here are steps you can take to help prevent an infection:    Keep good hand washing habits.    Dont have close contact with people who have sore throats, colds, or other upper respiratory infections.    Dont smoke, and stay away from secondhand smoke.  Date Last Reviewed: 11/1/2017 2000-2017 [employer]. [address] [place] [address], [place] [address]. All rights reserved. This information is not intended as a substitute for professional medical care. Always follow your healthcare professional's instructions.

## 2021-06-18 NOTE — PATIENT INSTRUCTIONS - HE
Patient Instructions by Monae Mcclure CNP at 5/17/2021  7:00 PM     Author: Monae Mcclure CNP Service: -- Author Type: Nurse Practitioner    Filed: 5/17/2021  7:23 PM Encounter Date: 5/17/2021 Status: Addendum    : Monae Mcclure CNP (Nurse Practitioner)    Related Notes: Original Note by Monae Mcclure CNP (Nurse Practitioner) filed at 5/17/2021  7:22 PM       Ibuprofen 600 mg 4 times daily with food    Cyclobenzaprine (flexeril) for muscle relaxer 10 mg at night (can take 5 mg twice daily as well if not having to drive/work).      Recheck on Friday - new provider     Warm packs to your neck     Consider trigger point injection later this week.  Consider asking about physical therapy.      You have a trapezius muscle strain most likely shooting down your neck      Patient Education     Head Tilt / Upper Trapezius Stretch (Flexibility)    1. Sit up straight in a chair with your head and neck in a neutral position, ears in line with shoulders. Hold the edge of your chair seat with your right hand. Tuck your chin in slightly.  2. Tilt your head to the left, while looking straight ahead.  3. Put your left hand on the right side of your head. Gently pull your head to the left. Hold for 30 to 60 seconds. Use gentle pressure to increase the stretch. Dont force your head into position.  4. Return your head and neck to the neutral position.  5. Repeat this exercise 2 times, or as instructed.  6. Switch sides and repeat 2 times, or as instructed.  Challenge yourself  Tuck one end of a towel under your left arm. Then bring the other end over your right shoulder. Pull the towel down on your right shoulder with both hands as you side-bend your head to the left. Repeat with the other side.   Date Last Reviewed: 3/10/2016    6760-4686 [employer]. [address] [facility], [place] [address]. All rights reserved. This information is not intended as a substitute for professional medical care. Always  follow your healthcare professional's instructions.

## 2021-06-18 NOTE — PATIENT INSTRUCTIONS - HE
Patient Instructions by Monae Mcclure CNP at 8/13/2020  3:20 PM     Author: Monae Mcclure CNP Service: -- Author Type: Nurse Practitioner    Filed: 8/13/2020  4:10 PM Encounter Date: 8/13/2020 Status: Addendum    : Monae Mcclure CNP (Nurse Practitioner)    Related Notes: Original Note by Mnoae Mcclure CNP (Nurse Practitioner) filed at 8/13/2020  4:09 PM       Throw away toothbrush after 24 hours.  No work/school for at least 24 hours following start of treatment or for 24 hours after temperature less than 100.4 F.  Push fluids.  Ibuprofen or Tylenol for pain as directed on package.  Return to clinic if not feeling much better in 2 or 3 days or new symptoms develop.        Patient Education     Pharyngitis: Strep (Confirmed)    You have had a positive test for strep throat. Strep throat is a contagious illness. It is spread by coughing, kissing or by touching others after touching your mouth or nose. Symptoms include throat pain that is worse with swallowing, aching all over, headache, and fever. It is treated with antibiotic medicine. This should help you start to feel better in 1 to 2 days.  Home care    Rest at home. Drink plenty of fluids to you won't get dehydrated.    No work or school for the first 2 days of taking the antibiotics. After this time, you will not be contagious. You can then return to school or work if you are feeling better.     Take antibiotic medicine for the full 10 days, even if you feel better. This is very important to ensure the infection is treated. It is also important to prevent medicine-resistant germs from developing. If you were given an antibiotic shot, you don't need any more antibiotics.    You may use acetaminophen or ibuprofen to control pain or fever, unless another medicine was prescribed for this. Talk with your healthcare provider before taking these medicines if you have chronic liver or kidney disease. Also talk with your healthcare provider if you  have had a stomach ulcer or GI bleeding.    Throat lozenges or sprays help reduce pain. Gargling with warm saltwater will also reduce throat pain. Dissolve 1/2 teaspoon of salt in 1 glass of warm water. This may be useful just before meals.     Soft foods are OK. Don't eat salty or spicy foods.  Follow-up care  Follow up with your healthcare provider or our staff if you don't get better over the next week.  When to seek medical advice  Call your healthcare provider right away if any of these occur:    Fever of 100.4 F (38 C) or higher, or as directed by your healthcare provider    New or worsening ear pain, sinus pain, or headache    Painful lumps in the back of neck    Stiff neck    Lymph nodes getting larger or becoming soft in the middle    You can't swallow liquids or you can't open your mouth wide because of throat pain    Signs of dehydration. These include very dark urine or no urine, sunken eyes, and dizziness.    Trouble breathing or noisy breathing    Muffled voice    Rash  Prevention  Here are steps you can take to help prevent an infection:    Keep good hand washing habits.    Dont have close contact with people who have sore throats, colds, or other upper respiratory infections.    Dont smoke, and stay away from secondhand smoke.  Date Last Reviewed: 11/1/2017 2000-2017 [employer]. [address] [place] [address], [place] [address]. All rights reserved. This information is not intended as a substitute for professional medical care. Always follow your healthcare professional's instructions.

## 2021-06-19 NOTE — LETTER
Letter by Maria Alejandra Rich PA-C at      Author: Maria Alejandra Rich PA-C Service: -- Author Type: --    Filed:  Encounter Date: 11/19/2019 Status: Signed         Blake Degroot  [address] [address]  [place] [address]             November 19, 2019         Dear Mr. Degroot,    Below are the results from your recent visit:       CXR is now normal      Please call with questions or contact us using [employer].    Sincerely,        Electronically signed by Maria Alejandra Rich PA-C

## 2021-06-20 NOTE — LETTER
Letter by Maria Alejandra Rich PA-C at      Author: Maria Alejandra Rich PA-C Service: -- Author Type: --    Filed:  Encounter Date: 12/19/2019 Status: Signed         December 19, 2019     Patient: Blake Degroot   YOB: 1985   Date of Visit: 12/19/2019       To Whom it May Concern:    Blake Degroot was seen in my clinic on 12/19/2019. He has been ill with a serious bacterial pneumonia and now seen today for an additional throat infection. He was off work from 12/9/19 to 12/22/19 and may return to work on 12/23/19,     If you have any questions or concerns, please don't hesitate to call.    Sincerely,         Electronically signed by Maria Alejandra Rich PA-C

## 2021-06-20 NOTE — LETTER
Letter by Monae Mcclure CNP at      Author: Monae Mcclure CNP Service: -- Author Type: --    Filed:  Encounter Date: 8/13/2020 Status: (Other)         August 13, 2020     Patient: Blake Degroot   YOB: 1985   Date of Visit: 8/13/2020       To Whom It May Concern:    It is my medical opinion that Blake Degroot may return to work on 8/15/2020.  He came to my clinic today feeling ill and was found to have strep throat.    If you have any questions or concerns, please don't hesitate to call.    Sincerely,        Electronically signed by Monae Mcclure CNP

## 2021-06-21 NOTE — LETTER
Letter by Arlene Galindo PA-C at      Author: Arlene Galindo PA-C Service: -- Author Type: --    Filed:  Encounter Date: 1/7/2021 Status: (Other)         January 7, 2021     Patient: Blake Degroot   YOB: 1985   Date of Visit: 1/7/2021       To Whom it May Concern:    Blake Degroot was seen in my clinic on 1/7/2021. Recommend resting at home for 1 to 3 days.     If you have any questions or concerns, please don't hesitate to call.    Sincerely,         Electronically signed by Arlene Galindo PA-C

## 2021-06-21 NOTE — LETTER
Letter by Monae Mcclure CNP at      Author: Monae Mcclure CNP Service: -- Author Type: --    Filed:  Encounter Date: 5/17/2021 Status: (Other)         May 17, 2021     Patient: Blake Degroot   YOB: 1985   Date of Visit: 5/17/2021       To Whom it May Concern:    Blake Degroot was seen in my clinic on 5/17/2021.  For work related injury: Light duty - no lifting greater than 10 lb and no overhead reaching for 4 days and then recheck in our clinic.     If you have any questions or concerns, please don't hesitate to call.    Sincerely,         Electronically signed by Monae Mcclure CNP

## 2021-06-26 NOTE — PROGRESS NOTES
"  Assessment and Plan     35 year old with new skin lesion on left upper chest of unknown etiology.  I recommend referral to dermatology for further work-up and consideration of biopsy.    1. Skin lesion  - Ambulatory referral to Dermatology - [facility] [place]    Follow up: PRN  Options for treatment and follow-up care were reviewed with the patient and/or guardian. Blake Degroot and/or guardian engaged in the decision making process and verbalized understanding of the options discussed and agreed with the final plan.    Dr. Krishna Cortes MD         HPI:   Blake Degroot is a 35 y.o.  male with problems as below, who presents for:    Chief Complaint   Patient presents with     Lesion/marking left side of chest     noticed a couple days ago     Jasper states that he was taking a shower last night when his wife noticed a lesion on his left chest.  He had not noticed this before.  He describes it as a \"skin tag\".  To me it appears more like a burn marla but he states there was no trauma to the area that he knows of.  Has always appeared like this since he noticed that last night.  Never vesicular changes.  Mild pain with it.  Area of redness around the central lesion that wife has marked with marker.  No other lesions on his body that he knows of.         PMHX:     Patient Active Problem List   Diagnosis     Essential hypertension     Headache     Tobacco use disorder     Community acquired pneumonia, bilateral     Pneumonia     Mild intermittent reactive airway disease without complication     Depression with anxiety       Current Outpatient Medications   Medication Sig Dispense Refill     acetaminophen (TYLENOL EXTRA STRENGTH) 500 MG tablet Take 2 tablets (1,000 mg total) by mouth every 6 (six) hours as needed for pain. 100 tablet 2     albuterol (PROAIR HFA;PROVENTIL HFA;VENTOLIN HFA) 90 mcg/actuation inhaler Inhale 2 puffs every 4 (four) hours as needed for wheezing. 1 Inhaler 0     amLODIPine " "(NORVASC) 5 MG tablet Take 1 tablet (5 mg total) by mouth daily. 30 tablet 0     cyclobenzaprine (FLEXERIL) 5 MG tablet Take 1 tablet (5 mg total) by mouth 3 (three) times a day as needed for muscle spasms. May take 1-2 tablets before bed. 30 tablet 0     ergotamine-caffeine (CAFERGOT) 1-100 mg per tablet Two tablets at onset of attack; then 1 tablet every 30 minutes as needed; maximum: 6 tablets per attack; do not exceed 10 tablets/week 100 tablet 0     fluticasone propionate (FLOVENT HFA) 44 mcg/actuation inhaler Inhale 1 puff 2 (two) times a day. 1 Inhaler 12     hydrOXYzine HCL (ATARAX) 25 MG tablet Take 1 tablet (25 mg total) by mouth every 6 (six) hours as needed for itching. 30 tablet 1     ibuprofen (ADVIL,MOTRIN) 800 MG tablet Take 1 tablet (800 mg total) by mouth every 8 (eight) hours as needed for pain. 60 tablet 2     lisinopril (PRINIVIL,ZESTRIL) 10 MG tablet Take 1 tablet (10 mg total) by mouth daily. 30 tablet 11     propranoloL (INDERAL) 20 MG tablet Take 1 tablet (20 mg total) by mouth 2 (two) times a day. 60 tablet 0     No current facility-administered medications for this visit.        Social History     Tobacco Use     Smoking status: Current Every Day Smoker     Packs/day: 0.00     Smokeless tobacco: Never Used   Substance Use Topics     Alcohol use: Yes     Comment: occasional     Drug use: No       Social History     Social History Narrative     Not on file       Allergies   Allergen Reactions     Amitriptyline Unknown     \"Was tired\"      Ibuprofen Unknown     Upset stomach if he has not eaten.     Imitrex [Sumatriptan Succinate] Unknown              Review of Systems:    Complete ROS is negative except as noted in the HPI         Physical Exam:   /64   Pulse 82   Temp 98.6  F (37  C) (Tympanic)   Resp 20   Ht 6' 4\" (1.93 m)   Wt 209 lb 11.2 oz (95.1 kg)   SpO2 98%   BMI 25.53 kg/m      General appearance: Alert, cooperative, no distress, appears stated age  Head: Normocephalic, " atraumatic, without obvious abnormality  Eyes: Pupils equal round, reactive.  Conjunctiva clear.  Neck: Supple, symmetric, trachea midline  Lungs: Clear to auscultation bilaterally, no wheezing or crackles present.  Respirations unlabored  Heart: Regular rate and rhythm, normal S1 and S2, no murmur, rub or gallop.  Skin: melanocytic appearing macule over left upper chest wall approximately 2 cm in diameter with surrounding mild erythema, no swelling, no discharge

## 2021-06-27 ENCOUNTER — HEALTH MAINTENANCE LETTER (OUTPATIENT)
Age: 36
End: 2021-06-27

## 2021-06-29 NOTE — PROGRESS NOTES
Progress Notes by Monae Mcclure CNP at 8/13/2020  3:20 PM     Author: Monae Mcclure CNP Service: -- Author Type: Nurse Practitioner    Filed: 8/13/2020  4:10 PM Encounter Date: 8/13/2020 Status: Signed    : Monae Mcclure CNP (Nurse Practitioner)       Chief Complaint   Patient presents with   ? Dizziness     x 1 dAY   ? Nausea   ? Emesis       ASSESSMENT & PLAN:   Diagnoses and all orders for this visit:    Streptococcal pharyngitis  -     amoxicillin (AMOXIL) 500 MG tablet; Take 2 tablets (1,000 mg total) by mouth daily for 10 days.  Dispense: 20 tablet; Refill: 0    Nausea  -     Rapid Strep A Screen-Throat  -     ondansetron disintegrating tablet 4 mg (ZOFRAN-ODT)        MDM:  Patient with vague malaise with lightheadedness and vomiting.  Throat is quite red despite lack of sore throat.  Patient found to have strep throat.    Work/school note provided.  Discussed throwing away toothbrush after 24 hours.  No work/school for at least 24 hours following start of treatment.  Push fluids.  Ibuprofen or Tylenol for pain as directed on package.  Return to clinic if not feeling much better in 2 or 3 days or new symptoms develop.            Supportive care discussed.  See discharge instructions below for specific recommendations given.    At the end of the encounter, I discussed results, diagnosis, medications. Discussed red flags for immediate return to clinic/ER, as well as indications for follow up if no improvement. Patient and/or caregiver understood and agreed to plan. Patient was stable for discharge.    SUBJECTIVE    HPI:  HPI  Blake Degroot presents to the walk-in clinic with   Chief Complaint   Patient presents with   ? Dizziness     x 1 dAY   ? Nausea   ? Emesis     Vomiting x 2 yesterday and once today.      Associated with: Nausea, lightheaded.      Had covid exposure at work, tested negative 2 weeks ago.  Has been longer than 14 days since exposure.    Works in a warehouse.  Was in a  crowded restaurant recently.      Denies: diarrhea, abd pain     See ROS for additional symptoms and/or pertinent negatives.       History obtained from the patient.    Past Medical History:   Diagnosis Date   ? Anxiety    ? Depression    ? Hypertension        Active Ambulatory (Non-Hospital) Problems    Diagnosis   ? Community acquired pneumonia, bilateral   ? Pneumonia   ? Mild intermittent reactive airway disease without complication   ? Tobacco use disorder   ? Essential hypertension   ? Headache       No family history on file.    Social History     Tobacco Use   ? Smoking status: Current Every Day Smoker     Packs/day: 0.00   ? Smokeless tobacco: Never Used   Substance Use Topics   ? Alcohol use: Yes     Comment: occasional       Review of Systems   Constitutional: Negative for fever.   HENT: Negative for congestion, ear pain, sinus pain and sore throat.    Respiratory: Negative for cough.    Gastrointestinal: Positive for nausea and vomiting. Negative for abdominal pain and diarrhea.   Neurological: Positive for light-headedness. Negative for headaches.       OBJECTIVE    Vitals:    08/13/20 1519   BP: 147/89   Pulse: 91   Temp: 98.9  F (37.2  C)   TempSrc: Oral   SpO2: 99%       Physical Exam  Constitutional:       General: He is not in acute distress.     Appearance: He is well-developed.   HENT:      Right Ear: External ear normal.      Left Ear: External ear normal.      Nose: No congestion or rhinorrhea.      Mouth/Throat:      Pharynx: Posterior oropharyngeal erythema present.      Tonsils: 2+ on the right. 2+ on the left.   Eyes:      General:         Right eye: No discharge.         Left eye: No discharge.      Conjunctiva/sclera: Conjunctivae normal.   Pulmonary:      Effort: Pulmonary effort is normal.   Musculoskeletal: Normal range of motion.   Skin:     General: Skin is warm and dry.      Capillary Refill: Capillary refill takes less than 2 seconds.   Neurological:      Mental Status: He is alert  and oriented to person, place, and time.   Psychiatric:         Behavior: Behavior normal.         Thought Content: Thought content normal.         Judgment: Judgment normal.         Labs:  Recent Results (from the past 240 hour(s))   Rapid Strep A Screen-Throat    Specimen: Throat   Result Value Ref Range    Rapid Strep A Antigen Group A Strep detected (!) No Group A Strep detected, presumptive negative         Radiology:    No results found.    PATIENT INSTRUCTIONS:   Patient Instructions   Throw away toothbrush after 24 hours.  No work/school for at least 24 hours following start of treatment or for 24 hours after temperature less than 100.4 F.  Push fluids.  Ibuprofen or Tylenol for pain as directed on package.  Return to clinic if not feeling much better in 2 or 3 days or new symptoms develop.        Patient Education     Pharyngitis: Strep (Confirmed)    You have had a positive test for strep throat. Strep throat is a contagious illness. It is spread by coughing, kissing or by touching others after touching your mouth or nose. Symptoms include throat pain that is worse with swallowing, aching all over, headache, and fever. It is treated with antibiotic medicine. This should help you start to feel better in 1 to 2 days.  Home care    Rest at home. Drink plenty of fluids to you won't get dehydrated.    No work or school for the first 2 days of taking the antibiotics. After this time, you will not be contagious. You can then return to school or work if you are feeling better.     Take antibiotic medicine for the full 10 days, even if you feel better. This is very important to ensure the infection is treated. It is also important to prevent medicine-resistant germs from developing. If you were given an antibiotic shot, you don't need any more antibiotics.    You may use acetaminophen or ibuprofen to control pain or fever, unless another medicine was prescribed for this. Talk with your healthcare provider before  taking these medicines if you have chronic liver or kidney disease. Also talk with your healthcare provider if you have had a stomach ulcer or GI bleeding.    Throat lozenges or sprays help reduce pain. Gargling with warm saltwater will also reduce throat pain. Dissolve 1/2 teaspoon of salt in 1 glass of warm water. This may be useful just before meals.     Soft foods are OK. Don't eat salty or spicy foods.  Follow-up care  Follow up with your healthcare provider or our staff if you don't get better over the next week.  When to seek medical advice  Call your healthcare provider right away if any of these occur:    Fever of 100.4 F (38 C) or higher, or as directed by your healthcare provider    New or worsening ear pain, sinus pain, or headache    Painful lumps in the back of neck    Stiff neck    Lymph nodes getting larger or becoming soft in the middle    You can't swallow liquids or you can't open your mouth wide because of throat pain    Signs of dehydration. These include very dark urine or no urine, sunken eyes, and dizziness.    Trouble breathing or noisy breathing    Muffled voice    Rash  Prevention  Here are steps you can take to help prevent an infection:    Keep good hand washing habits.    Dont have close contact with people who have sore throats, colds, or other upper respiratory infections.    Dont smoke, and stay away from secondhand smoke.  Date Last Reviewed: 11/1/2017 2000-2017 [employer]. [address] [place] [address], [place] [address]. All rights reserved. This information is not intended as a substitute for professional medical care. Always follow your healthcare professional's instructions.

## 2021-06-30 NOTE — PROGRESS NOTES
Progress Notes by Monae Mcclure CNP at 5/17/2021  7:00 PM     Author: Monae Mcclure CNP Service: -- Author Type: Nurse Practitioner    Filed: 5/17/2021  7:40 PM Encounter Date: 5/17/2021 Status: Signed    : Monae Mcclure CNP (Nurse Practitioner)       Chief Complaint   Patient presents with   ? Neck Pain     x1-2wks, pain starts in R side of neck and shoots down R arm, sometimes arm goes numb       ASSESSMENT & PLAN:   Diagnoses and all orders for this visit:    Neck pain  -     cyclobenzaprine (FLEXERIL) 5 MG tablet; Take 1 tablet (5 mg total) by mouth 3 (three) times a day as needed for muscle spasms. May take 1-2 tablets before bed.  Dispense: 30 tablet; Refill: 0    Strain of right trapezius muscle, initial encounter      Patient with right paracervical muscle pain right trapezius muscle pain and tenderness that intermittently radiates down the right arm.  Differential includes cervical radiculopathy, trapezius muscle strain, deltoid muscle strain, torticollis.    Due to heavy lifting at his job, exam findings and age, most likely more of a trapezius origin of pain.  Ibuprofen scheduled 4 times daily, cyclobenzaprine, warm packs, work restriction for 4 days due to hesitancy of not being at work, muscle relaxer.  Did offer trigger point injections, but patient would prefer to try rest and medication first.  Patient's PCP left our clinic, so he says he will follow up with another PCP later this week.  No appreciable muscle weakness today.    I think he would be appropriate for trigger point injections and/or physical therapy to increase mobility.     Supportive care discussed.  See discharge instructions below for specific recommendations given.    At the end of the encounter, I discussed results, diagnosis, medications with the patient and/or caregivers. Discussed red flags for immediate return to clinic/ER, as well as indications for follow up if no improvement. Patient and/or caregiver  understood and agreed to plan. Patient was stable for discharge.    15 minutes spent on the date of the encounter doing chart review, documentation and discussion with other provider(s)       SUBJECTIVE    HPI:  HPI  Blake Degroot presents to the walk-in clinic with   Chief Complaint   Patient presents with   ? Neck Pain     x1-2wks, pain starts in R side of neck and shoots down R arm, sometimes arm goes numb     Rt lateral neck pain radiating down shoulder to fingertips on radial side..  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  Thinks this is related to work, but is worried about using work comp because he thinks he will get in trouble.    Worse at night when sleeping.  Can only sleep in one position.    Hurts with moving arm in any type of heavy lifting.  Symptoms have been present for 2 weeks.  Sometime last week for same and had to leave work early today due to severity of symptoms.  Has not been seen by any provider for this.      See ROS for additional symptoms and/or pertinent negatives.       History obtained from the patient.      Review of Systems    Denies other issues except as above.    OBJECTIVE    Vitals:    05/17/21 1909   BP: 155/87   Patient Site: Right Arm   Patient Position: Sitting   Cuff Size: Adult Regular   Pulse: 83   Resp: 16   Temp: 99.7  F (37.6  C)   TempSrc: Oral   SpO2: 99%       Physical Exam  Constitutional:       General: He is not in acute distress.     Appearance: He is well-developed.   Eyes:      General:         Right eye: No discharge.         Left eye: No discharge.      Conjunctiva/sclera: Conjunctivae normal.   Cardiovascular:      Pulses: Normal pulses.   Pulmonary:      Effort: Pulmonary effort is normal.   Musculoskeletal: Normal range of motion.         General: Tenderness (Tenderness to upper mid trapezius muscle, right paracervical muscles and right deltoid area muscle) present.      Comments: Markedly decreased ability to touch scapula right reaching  the left compared to opposite.  Equal ability to reach overhead.   Skin:     General: Skin is warm and dry.      Capillary Refill: Capillary refill takes less than 2 seconds.   Neurological:      Mental Status: He is alert and oriented to person, place, and time.      Comments:  strength equal.   Psychiatric:         Mood and Affect: Mood normal.         Behavior: Behavior normal.         Thought Content: Thought content normal.         Judgment: Judgment normal.         Labs/EKG:          Radiology:        PATIENT INSTRUCTIONS:   Patient Instructions     Ibuprofen 600 mg 4 times daily with food    Cyclobenzaprine (flexeril) for muscle relaxer 10 mg at night (can take 5 mg twice daily as well if not having to drive/work).      Recheck on Friday - new provider     Warm packs to your neck     Consider trigger point injection later this week.  Consider asking about physical therapy.      You have a trapezius muscle strain most likely shooting down your neck      Patient Education     Head Tilt / Upper Trapezius Stretch (Flexibility)    1. Sit up straight in a chair with your head and neck in a neutral position, ears in line with shoulders. Hold the edge of your chair seat with your right hand. Tuck your chin in slightly.  2. Tilt your head to the left, while looking straight ahead.  3. Put your left hand on the right side of your head. Gently pull your head to the left. Hold for 30 to 60 seconds. Use gentle pressure to increase the stretch. Dont force your head into position.  4. Return your head and neck to the neutral position.  5. Repeat this exercise 2 times, or as instructed.  6. Switch sides and repeat 2 times, or as instructed.  Challenge yourself  Tuck one end of a towel under your left arm. Then bring the other end over your right shoulder. Pull the towel down on your right shoulder with both hands as you side-bend your head to the left. Repeat with the other side.   Date Last Reviewed: 3/10/2016     [id number] [employer]. [address] [place] [address], [place] [address]. All rights reserved. This information is not intended as a substitute for professional medical care. Always follow your healthcare professional's instructions.

## 2021-07-05 PROBLEM — F41.8 DEPRESSION WITH ANXIETY: Status: ACTIVE | Noted: 2017-08-21

## 2021-10-12 ENCOUNTER — AMBULATORY - RIVER FALLS (OUTPATIENT)
Dept: FAMILY MEDICINE | Facility: CLINIC | Age: 36
End: 2021-10-12

## 2021-10-12 ENCOUNTER — OFFICE VISIT - RIVER FALLS (OUTPATIENT)
Dept: FAMILY MEDICINE | Facility: CLINIC | Age: 36
End: 2021-10-12

## 2021-10-12 ENCOUNTER — LAB REQUISITION (OUTPATIENT)
Dept: LAB | Facility: CLINIC | Age: 36
End: 2021-10-12
Payer: COMMERCIAL

## 2021-10-12 DIAGNOSIS — U07.1 COVID-19: ICD-10-CM

## 2021-10-12 PROCEDURE — U0003 INFECTIOUS AGENT DETECTION BY NUCLEIC ACID (DNA OR RNA); SEVERE ACUTE RESPIRATORY SYNDROME CORONAVIRUS 2 (SARS-COV-2) (CORONAVIRUS DISEASE [COVID-19]), AMPLIFIED PROBE TECHNIQUE, MAKING USE OF HIGH THROUGHPUT TECHNOLOGIES AS DESCRIBED BY CMS-2020-01-R: HCPCS | Mod: ORL | Performed by: FAMILY MEDICINE

## 2021-10-13 LAB — SARS-COV-2 RNA RESP QL NAA+PROBE: NEGATIVE

## 2021-10-14 LAB
BACTERIA SPEC CULT: NORMAL
SARS-COV-2 RNA RESP QL NAA+PROBE: NEGATIVE

## 2021-10-17 ENCOUNTER — HEALTH MAINTENANCE LETTER (OUTPATIENT)
Age: 36
End: 2021-10-17

## 2021-10-28 LAB — DEPRECATED S PYO AG THROAT QL EIA: NEGATIVE

## 2021-12-10 ENCOUNTER — OFFICE VISIT - RIVER FALLS (OUTPATIENT)
Dept: FAMILY MEDICINE | Facility: CLINIC | Age: 36
End: 2021-12-10

## 2021-12-10 ASSESSMENT — MIFFLIN-ST. JEOR: SCORE: 1905.91

## 2021-12-14 ENCOUNTER — APPOINTMENT (OUTPATIENT)
Dept: GENERAL RADIOLOGY | Facility: CLINIC | Age: 36
End: 2021-12-14
Attending: EMERGENCY MEDICINE
Payer: COMMERCIAL

## 2021-12-14 ENCOUNTER — HOSPITAL ENCOUNTER (EMERGENCY)
Facility: CLINIC | Age: 36
Discharge: HOME | End: 2021-12-14
Attending: EMERGENCY MEDICINE | Admitting: EMERGENCY MEDICINE
Payer: COMMERCIAL

## 2021-12-14 ENCOUNTER — APPOINTMENT (OUTPATIENT)
Dept: CT IMAGING | Facility: CLINIC | Age: 36
End: 2021-12-14
Attending: EMERGENCY MEDICINE
Payer: COMMERCIAL

## 2021-12-14 VITALS
OXYGEN SATURATION: 100 % | DIASTOLIC BLOOD PRESSURE: 86 MMHG | TEMPERATURE: 97 F | SYSTOLIC BLOOD PRESSURE: 131 MMHG | HEART RATE: 96 BPM | RESPIRATION RATE: 16 BRPM

## 2021-12-14 DIAGNOSIS — M25.512 ACUTE PAIN OF LEFT SHOULDER: ICD-10-CM

## 2021-12-14 DIAGNOSIS — S09.90XA INJURY OF HEAD, INITIAL ENCOUNTER: ICD-10-CM

## 2021-12-14 DIAGNOSIS — R07.81 RIB PAIN: ICD-10-CM

## 2021-12-14 PROCEDURE — 71101 X-RAY EXAM UNILAT RIBS/CHEST: CPT | Mod: LT

## 2021-12-14 PROCEDURE — 99284 EMERGENCY DEPT VISIT MOD MDM: CPT | Mod: 25

## 2021-12-14 PROCEDURE — 73030 X-RAY EXAM OF SHOULDER: CPT | Mod: LT

## 2021-12-14 PROCEDURE — 72100 X-RAY EXAM L-S SPINE 2/3 VWS: CPT

## 2021-12-14 PROCEDURE — 250N000013 HC RX MED GY IP 250 OP 250 PS 637: Performed by: EMERGENCY MEDICINE

## 2021-12-14 PROCEDURE — 70450 CT HEAD/BRAIN W/O DYE: CPT

## 2021-12-14 RX ORDER — CYCLOBENZAPRINE HCL 10 MG
10 TABLET ORAL 3 TIMES DAILY PRN
Qty: 20 TABLET | Refills: 0 | Status: SHIPPED | OUTPATIENT
Start: 2021-12-14 | End: 2021-12-17

## 2021-12-14 RX ORDER — IBUPROFEN 600 MG/1
600 TABLET, FILM COATED ORAL ONCE
Status: COMPLETED | OUTPATIENT
Start: 2021-12-14 | End: 2021-12-14

## 2021-12-14 RX ORDER — ACETAMINOPHEN 325 MG/1
975 TABLET ORAL ONCE
Status: COMPLETED | OUTPATIENT
Start: 2021-12-14 | End: 2021-12-14

## 2021-12-14 RX ORDER — OXYCODONE HYDROCHLORIDE 5 MG/1
5 TABLET ORAL ONCE
Status: COMPLETED | OUTPATIENT
Start: 2021-12-14 | End: 2021-12-14

## 2021-12-14 RX ADMIN — ACETAMINOPHEN 975 MG: 325 TABLET, FILM COATED ORAL at 07:59

## 2021-12-14 RX ADMIN — IBUPROFEN 600 MG: 600 TABLET ORAL at 07:59

## 2021-12-14 RX ADMIN — OXYCODONE HYDROCHLORIDE 5 MG: 5 TABLET ORAL at 10:51

## 2021-12-14 NOTE — ED NOTES
Patient alert and oriented. Respirations even and unlabored. All discharge education given. Patient given incentive spirometer and instructed on proper use. Patient able to teach back proper use. All questions answered. All medications explained in detail. Patient denies further needs and states that they are ready to leave. Patient ambulated out of the ER with steady gait.
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
## 2021-12-14 NOTE — DISCHARGE INSTRUCTIONS
Please continue to take deep breaths and cough, use incentive spirometry as well.  Avoid splinting on the side of injury.  If you notice increasing pain, cough productive of sputum, fevers, pain elsewhere or other acute changes return to the ED.      Please follow with your PCP in 2-3 days.  Return to the ED if notice increasing weakness, headache, confusion, not acting like your self, vomiting more than 2 times, or other acute changes.    May use flexeril for pain, do no drink or drive with this medication.  Use tylenol and ibuprofen as needed as well.      Discharge Instructions  Chest Injury    You have been seen today because of a chest injury.  You may have contusion (bruise) of the chest or a rib fracture (broken bone).  Rib fractures can be hard to see on x-ray, so we cannot always be sure whether your rib is broken or bruised. Fortunately, the treatment of these injuries is usually the same, and includes pain control and preventing complications.    Generally, every Emergency Department visit should have a follow-up clinic visit with either a primary or a specialty clinic/provider. Please follow-up as instructed by your emergency provider today.    Return to the Emergency Department if:  You become short of breath.  You develop a fever over 101.5 F.  You pass out or become very weak or pale.  You have abdominal (belly) pain that is new or increasing.  You cough up blood.  You have new symptoms or anything that worries you.    Follow-up with your provider:  As directed by your provider today.  If you are not improved in two weeks.  If you need more pain medicine, since we do not refill pain pills through the Emergency Department.    Home care instructions:  Chest injuries can be painful.  You may take an over-the-counter pain medication such as Tylenol  (acetaminophen), Advil  (ibuprofen), Motrin  (ibuprofen) or Aleve  (naproxen).  Applying ice packs to the painful area can help your pain.   Holding a pillow  against your chest can help with pain when you need to move or cough.  You may need to rest and avoid lifting particularly in the first few days after your injury.  Prevention of pneumonia (lung infection) is also a part of managing chest injuries.  Because it can hurt to take deep breaths, you could develop collapsed areas of lung that can develop infection.  To prevent this, you need to take ten very deep breaths every hour while you are awake. Sometimes you will be given a device called an incentive spirometer to help with this. You also need to make yourself cough every hour.  Rib belts or binders are not generally recommended, since they may increase the risk of pneumonia. If you do use one, use it for only short periods of time.   If you were given a prescription for medicine here today, be sure to read all of the information (including the package insert) that comes with your prescription.  This will include important information about the medicine, its side effects, and any warnings that you need to know about.  The pharmacist who fills the prescription can provide more information and answer questions you may have about the medicine.  If you have questions or concerns that the pharmacist cannot address, please call or return to the Emergency Department.   Remember that you can always come back to the Emergency Department if you are not able to see your regular provider in the amount of time listed above, if you get any new symptoms, or if there is anything that worries you.    Discharge Instructions  Head Injury    You have been seen today for a head injury. Your evaluation included a history and physical examination. You may have had a CT (CAT) scan performed, though most head injuries do not require a scan. Based on this evaluation, your provider today does not feel that your head injury is serious.    Generally, every Emergency Department visit should have a follow-up clinic visit with either a primary  or a specialty clinic/provider. Please follow-up as instructed by your emergency provider today.  Return to the Emergency Department if:  You are confused or you are not acting right.  Your headache gets worse or you start to have a really bad headache even with your recommended treatment plan.  You vomit (throw up) more than once.  You have a seizure.  You have trouble walking.  You have weakness or paralysis (cannot move) in an arm or a leg.  You have blood or fluid coming from your ears or nose.  You have new symptoms or anything that worries you.    Sleeping:  It is okay for you to sleep, but someone should wake you up if instructed by your provider, and someone should check on you at your usual time to wake up.     Activity:  Do not drive for at least 24 hours.  Do not drive if you have dizzy spells or trouble concentrating, or remembering things.  Do not return to any contact sports until cleared by your regular provider.     MORE INFORMATION:    Concussion:  A concussion is a minor head injury that may cause temporary problems with the way the brain works. Although concussions are important, they are generally not an emergency or a reason that a person needs to be hospitalized. Some concussion symptoms include confusion, amnesia (forgetful), nausea (sick to your stomach) and vomiting (throwing up), dizziness, fatigue, memory or concentration problems, irritability and sleep problems. For most people, concussions are mild and temporary but some will have more severe and persistent symptoms that require on-going care and treatment.  CT Scans: Your evaluation today may have included a CT scan (CAT scan) to look for things like bleeding or a skull fracture (broken bone).  CT scans involve radiation and too many CT scans can cause serious health problems like cancer, especially in children.  Because of this, your provider may not have ordered a CT scan today if they think you are at low risk for a serious or life  threatening problem.    If you were given a prescription for medicine here today, be sure to read all of the information (including the package insert) that comes with your prescription.  This will include important information about the medicine, its side effects, and any warnings that you need to know about.  The pharmacist who fills the prescription can provide more information and answer questions you may have about the medicine.  If you have questions or concerns that the pharmacist cannot address, please call or return to the Emergency Department.     Remember that you can always come back to the Emergency Department if you are not able to see your regular provider in the amount of time listed above, if you get any new symptoms, or if there is anything that worries you.
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
## 2021-12-17 ENCOUNTER — OFFICE VISIT (OUTPATIENT)
Dept: FAMILY MEDICINE | Facility: CLINIC | Age: 36
End: 2021-12-17
Payer: COMMERCIAL

## 2021-12-17 VITALS
WEIGHT: 202.6 LBS | HEART RATE: 90 BPM | OXYGEN SATURATION: 99 % | HEIGHT: 74 IN | DIASTOLIC BLOOD PRESSURE: 88 MMHG | BODY MASS INDEX: 26 KG/M2 | SYSTOLIC BLOOD PRESSURE: 141 MMHG
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
## 2021-12-17 DIAGNOSIS — M25.512 ACUTE PAIN OF LEFT SHOULDER: Primary | ICD-10-CM

## 2021-12-17 DIAGNOSIS — S20.212D CONTUSION OF RIB ON LEFT SIDE, SUBSEQUENT ENCOUNTER: ICD-10-CM

## 2021-12-17 PROBLEM — J18.9 COMMUNITY ACQUIRED PNEUMONIA, BILATERAL: Status: RESOLVED | Noted: 2019-11-12 | Resolved: 2021-12-17

## 2021-12-17 PROBLEM — J18.9 PNEUMONIA: Status: RESOLVED | Noted: 2019-11-12 | Resolved: 2021-12-17

## 2021-12-17 PROCEDURE — 99214 OFFICE O/P EST MOD 30 MIN: CPT | Performed by: FAMILY MEDICINE

## 2021-12-17 RX ORDER — TRAMADOL HYDROCHLORIDE 50 MG/1
50 TABLET ORAL EVERY 6 HOURS PRN
Qty: 12 TABLET | Refills: 0 | Status: SHIPPED | OUTPATIENT
Start: 2021-12-17 | End: 2021-12-22

## 2021-12-17 ASSESSMENT — MIFFLIN-ST. JEOR: SCORE: 1913.74

## 2021-12-17 ASSESSMENT — ASTHMA QUESTIONNAIRES
QUESTION_4 LAST FOUR WEEKS HOW OFTEN HAVE YOU USED YOUR RESCUE INHALER OR NEBULIZER MEDICATION (SUCH AS ALBUTEROL): ONCE A WEEK OR LESS
QUESTION_3 LAST FOUR WEEKS HOW OFTEN DID YOUR ASTHMA SYMPTOMS (WHEEZING, COUGHING, SHORTNESS OF BREATH, CHEST TIGHTNESS OR PAIN) WAKE YOU UP AT NIGHT OR EARLIER THAN USUAL IN THE MORNING: ONCE OR TWICE
QUESTION_5 LAST FOUR WEEKS HOW WOULD YOU RATE YOUR ASTHMA CONTROL: SOMEWHAT CONTROLLED
QUESTION_2 LAST FOUR WEEKS HOW OFTEN HAVE YOU HAD SHORTNESS OF BREATH: ONCE OR TWICE A WEEK
ACT_TOTALSCORE: 19
QUESTION_1 LAST FOUR WEEKS HOW MUCH OF THE TIME DID YOUR ASTHMA KEEP YOU FROM GETTING AS MUCH DONE AT WORK, SCHOOL OR AT HOME: A LITTLE OF THE TIME

## 2021-12-18 ASSESSMENT — ASTHMA QUESTIONNAIRES: ACT_TOTALSCORE: 19
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
## 2022-01-18 VITALS
OXYGEN SATURATION: 100 % | HEIGHT: 75 IN | BODY MASS INDEX: 28.1 KG/M2 | SYSTOLIC BLOOD PRESSURE: 138 MMHG | HEART RATE: 76 BPM | WEIGHT: 226 LBS | RESPIRATION RATE: 18 BRPM | TEMPERATURE: 98.6 F | DIASTOLIC BLOOD PRESSURE: 82 MMHG

## 2022-01-18 VITALS
TEMPERATURE: 98.6 F | HEIGHT: 76 IN | BODY MASS INDEX: 25.54 KG/M2 | RESPIRATION RATE: 20 BRPM | DIASTOLIC BLOOD PRESSURE: 64 MMHG | HEART RATE: 82 BPM | OXYGEN SATURATION: 98 % | WEIGHT: 209.7 LBS | SYSTOLIC BLOOD PRESSURE: 128 MMHG

## 2022-01-18 VITALS
RESPIRATION RATE: 16 BRPM | HEART RATE: 83 BPM | DIASTOLIC BLOOD PRESSURE: 87 MMHG | TEMPERATURE: 99.7 F | OXYGEN SATURATION: 99 % | SYSTOLIC BLOOD PRESSURE: 155 MMHG

## 2022-01-18 VITALS
DIASTOLIC BLOOD PRESSURE: 82 MMHG | OXYGEN SATURATION: 100 % | HEART RATE: 86 BPM | TEMPERATURE: 98.8 F | SYSTOLIC BLOOD PRESSURE: 153 MMHG | WEIGHT: 210.2 LBS | RESPIRATION RATE: 16 BRPM | BODY MASS INDEX: 25.59 KG/M2

## 2022-01-18 VITALS
SYSTOLIC BLOOD PRESSURE: 156 MMHG | BODY MASS INDEX: 27.3 KG/M2 | WEIGHT: 224.25 LBS | HEART RATE: 86 BPM | DIASTOLIC BLOOD PRESSURE: 95 MMHG | OXYGEN SATURATION: 100 %

## 2022-01-18 VITALS
HEART RATE: 91 BPM | OXYGEN SATURATION: 99 % | DIASTOLIC BLOOD PRESSURE: 89 MMHG | SYSTOLIC BLOOD PRESSURE: 147 MMHG | TEMPERATURE: 98.9 F

## 2022-01-18 VITALS
RESPIRATION RATE: 18 BRPM | SYSTOLIC BLOOD PRESSURE: 148 MMHG | DIASTOLIC BLOOD PRESSURE: 89 MMHG | TEMPERATURE: 98.5 F | OXYGEN SATURATION: 98 % | HEART RATE: 95 BPM

## 2022-01-18 VITALS
DIASTOLIC BLOOD PRESSURE: 70 MMHG | WEIGHT: 220 LBS | BODY MASS INDEX: 26.79 KG/M2 | SYSTOLIC BLOOD PRESSURE: 120 MMHG | HEIGHT: 76 IN

## 2022-02-12 VITALS
HEIGHT: 74 IN | SYSTOLIC BLOOD PRESSURE: 122 MMHG | RESPIRATION RATE: 16 BRPM | HEART RATE: 80 BPM | WEIGHT: 200 LBS | BODY MASS INDEX: 25.67 KG/M2 | TEMPERATURE: 97.9 F | DIASTOLIC BLOOD PRESSURE: 78 MMHG

## 2022-02-15 NOTE — NURSING NOTE
Rapid Strep POC Entered On:  10/28/2021 1:52 PM CDT    Performed On:  10/12/2021 1:52 PM CDT by Keyona Crockett               Rapid Strep POC   Rapid Strep POC :   Negative   POC Test Comments :   Performed at [facility]   Keyona Crockett - 10/28/2021 1:52 PM CDT

## 2022-02-15 NOTE — TELEPHONE ENCOUNTER
---------------------  From: Chance Delaney MD   Sent: 10/12/2021 6:09:32 PM CDT  Subject: General Message     called and left message for neg rapid strep

## 2022-02-15 NOTE — TELEPHONE ENCOUNTER
---------------------  From: Caden LLANOSJessie   Sent: 10/12/2021 4:18:05 PM CDT  Subject: [facility] Testing     Pt was seen at [facility] for covid and strep testing per Dr. Delaney. 02 Sat=98%. Pt resides in [facility]-will notify [facility] if positive.

## 2022-02-15 NOTE — NURSING NOTE
Comprehensive Intake Entered On:  12/10/2021 3:05 PM CST    Performed On:  12/10/2021 2:59 PM CST by Adebayo Pickard CMA               Summary   Chief Complaint :   Pt here for laceration on 4th finger right hand 2 days ago. Pt does need a note for work   Weight Measured :   200 lb(Converted to: 200 lb 0 oz, 90.718 kg)    Height Measured :   74.25 in(Converted to: 6 ft 2 in, 188.59 cm)    Body Mass Index :   25.5 kg/m2 (HI)    Body Surface Area :   2.18 m2   Systolic Blood Pressure :   122 mmHg   Diastolic Blood Pressure :   78 mmHg   Mean Arterial Pressure :   93 mmHg   Peripheral Pulse Rate :   80 bpm   BP Site :   Right arm   Pulse Site :   Radial artery   Temperature Tympanic :   97.9 DegF(Converted to: 36.6 DegC)    Respiratory Rate :   16 br/min   Adebayo Pickard CMA - 12/10/2021 2:59 PM CST   Health Status   Allergies Verified? :   Yes   Medication History Verified? :   Yes   Medical History Verified? :   Yes   Pre-Visit Planning Status :   Not completed   Adebayo Pickard CMA - 12/10/2021 2:59 PM CST   Meds / Allergies   (As Of: 12/10/2021 3:05:59 PM CST)   Allergies (Active)   ibuprofen  Estimated Onset Date:   Unspecified ; Created By:   Jennifer Arteaga; Reaction Status:   Active ; Category:   Drug ; Substance:   ibuprofen ; Type:   Allergy ; Updated By:   Jennifer Arteaga; Reviewed Date:   12/10/2021 3:03 PM CST        Medication List   (As Of: 12/10/2021 3:05:59 PM CST)   Home Meds    albuterol  :   albuterol ; Status:   Documented ; Ordered As Mnemonic:   albuterol ; Simple Display Line:   0 Refill(s) ; Catalog Code:   albuterol ; Order Dt/Tm:   12/10/2021 3:03:22 PM CST          lisinopril  :   lisinopril ; Status:   Documented ; Ordered As Mnemonic:   lisinopril ; Simple Display Line:   Oral, daily, 0 Refill(s) ; Catalog Code:   lisinopril ; Order Dt/Tm:   10/12/2021 1:29:30 PM CDT            Social History   Social History   (As Of: 12/10/2021 3:05:59 PM CST)   Tobacco:        10 or  more cigarettes (1/2 pack or more)/day in last 30 days, Cigars, 20 per day.   (Last Updated: 12/10/2021 3:04:17 PM CST by Adebayo Pickard CMA)          Electronic Cigarette/Vaping:        Electronic Cigarette Use: Never.   (Last Updated: 10/12/2021 1:29:58 PM CDT by Jennifer Arteaga)

## 2022-02-15 NOTE — NURSING NOTE
Comprehensive Intake Entered On:  10/12/2021 1:30 PM CDT    Performed On:  10/12/2021 1:28 PM CDT by Jennifer Arteaga               Summary   Chief Complaint :   c/o sore throat. Verbal consent given for video visit.    HR Method :   Manual   Jennifer Arteaga - 10/12/2021 1:28 PM CDT   Health Status   Allergies Verified? :   Yes   Medication History Verified? :   Yes   Medical History Verified? :   Yes   Jennifer Arteaga - 10/12/2021 1:28 PM CDT   Consents   Consent for Immunization Exchange :   Consent Granted   Consent for Immunizations to Providers :   Consent Granted   Jennifer Arteaga - 10/12/2021 1:28 PM CDT   Meds / Allergies   (As Of: 10/12/2021 1:30:47 PM CDT)   Allergies (Active)   ibuprofen  Estimated Onset Date:   Unspecified ; Created By:   Jennifer Arteaga; Reaction Status:   Active ; Category:   Drug ; Substance:   ibuprofen ; Type:   Allergy ; Updated By:   Jennifer Arteaga; Reviewed Date:   10/12/2021 1:29 PM CDT        Medication List   (As Of: 10/12/2021 1:30:47 PM CDT)   Home Meds    lisinopril  :   lisinopril ; Status:   Documented ; Ordered As Mnemonic:   lisinopril ; Simple Display Line:   Oral, daily, 0 Refill(s) ; Catalog Code:   lisinopril ; Order Dt/Tm:   10/12/2021 1:29:30 PM CDT            Social History   Social History   (As Of: 10/12/2021 1:30:47 PM CDT)   Tobacco:        Smoker, current status unknown   (Last Updated: 10/12/2021 1:29:55 PM CDT by Jennifer Arteaga)          Electronic Cigarette/Vaping:        Electronic Cigarette Use: Never.   (Last Updated: 10/12/2021 1:29:58 PM CDT by Jennifer Arteaga)

## 2022-02-15 NOTE — PROGRESS NOTES
Patient:   DEIRDRE DODSON            MRN: [id number]            FIN: [id number]               Age:   36 years     Sex:  Male     :  1985   Associated Diagnoses:   Sore throat   Author:   Chance Delaney MD      Visit Information      Date of Service: 10/12/2021 11:56 am  Performing Location: [facility]  Encounter#: [id number]      Primary Care Provider (PCP):  NONE ,       Referring Provider:  Chance Delaney MD    NPI# [id number]      Chief Complaint   10/12/2021 1:28 PM CDT   c/o sore throat. Verbal consent given for video visit.        Subjective   Chief complaint 10/12/2021 1:28 PM CDT   c/o sore throat. Verbal consent given for video visit.  .     consent for video visit  patient at home,  called from office 140 to 155    able to eat and drink  some congestion  but mostly sore throat  had negative covid test 2 weeks ago             Health Status   Allergies:    Allergic Reactions (Selected)  Severity Not Documented  Ibuprofen (No reactions were documented)   Medications:  (Selected)   Documented Medications  Documented  lisinopril: Oral, daily, 0 Refill(s), Type: Maintenance,    Medications          *denotes recorded medication          *lisinopril: Oral, daily, 0 Refill(s).       Problem list:    All Problems  Tobacco user / [id number] / Probable      Objective   Vital Signs   10/12/2021 1:28 PM CDT   HR Method                 Manual     alert, normal voice  non labored respirations      Impression and Plan   Assessment and Plan:          Diagnosis: Sore throat ([id number] J02.9).    Orders     will do covid and rapid strep.     .

## 2022-02-15 NOTE — PROGRESS NOTES
Patient:   DEIRDRE DODSON            MRN: [id number]            FIN: [id number]               Age:   36 years     Sex:  Male     :  1985   Associated Diagnoses:   Cellulitis of finger of right hand; Laceration of finger   Author:   Ralph Yip PA-C      Chief Complaint   12/10/2021 2:59 PM CST   Pt here for laceration on 4th finger right hand 2 days ago. Pt does need a note for work      History of Present Illness   Chief complaint and symptoms noted above and confirmed with patient   laceration of 4th finger on right hand 2 days ago at work ([employer]) but he does not want this as work comp  he cut it on piece of metal, washed it out well, put a liquid bandage on it    he would like a note saying he can go back to work in 3 days (on )  last tetanus was 2019      Health Status   Allergies:    Allergic Reactions (Selected)  Severity Not Documented  Ibuprofen (No reactions were documented)   Medications:  (Selected)   Documented Medications  Documented  albuterol: 0 Refill(s), Type: Maintenance  lisinopril: Oral, daily, 0 Refill(s), Type: Maintenance   Problem list:    All Problems  Tobacco user / SNOMED CT [id number] / Probable      Histories   Past Medical History:    No active or resolved past medical history items have been selected or recorded.   Family History:    No family history items have been selected or recorded.   Procedure history:    No active procedure history items have been selected or recorded.   Social History:        Electronic Cigarette/Vaping Assessment            Electronic Cigarette Use: Never.      Tobacco Assessment            10 or more cigarettes (1/2 pack or more)/day in last 30 days, Cigars, 20 per day.        Physical Examination   Vital Signs   12/10/2021 2:59 PM CST Temperature Tympanic 97.9 DegF    Peripheral Pulse Rate 80 bpm    Pulse Site Radial artery    Respiratory Rate 16 br/min    Systolic Blood Pressure 122 mmHg    Diastolic Blood Pressure 78 mmHg    Mean  Arterial Pressure 93 mmHg    BP Site Right arm      Measurements from flowsheet : Measurements   12/10/2021 2:59 PM CST Height Measured - Standard 74.25 in    Weight Measured - Standard 200 lb    BSA 2.18 m2    Body Mass Index 25.5 kg/m2  HI      General:  No acute distress.    Integumentary:  there is a 2 cm linear laceration across the top of PIP of right ring finger,  some skin separation, no active bleeding, the PIP joint is tender to palpation,.       Impression and Plan   Diagnosis     Cellulitis of finger of right hand (ZNC97-QB L03.011).     Laceration of finger ([id number] S61.219A).     Summary:  1/4 inch steri strips are applied across wound,  after the steri strips slough off then can use bacitracin and cover with a bandage  will cover for infection with cephalexin for 7 days  follow up if not improving.    Orders     Orders   Pharmacy:  cephalexin 500 mg oral tablet (Prescribe): = 1 tab(s) ( 500 mg ), Oral, tid, x 7 day(s), # 21 tab(s), 0 Refill(s), Type: Acute, Pharmacy: [facility] #[id number], 1 tab(s) Oral tid,x7 day(s), 74.25, in, 12/10/2021 2:59 PM CST, Height Measured, 200, lb, 12/10/2021 2:59 PM CST, Weight Measured.     Orders   Charges (Evaluation and Management):  24363 office o/p est low 20-29 min (Charge) (Order): Quantity: 1, Laceration of finger  Cellulitis of finger of right hand.

## 2022-02-15 NOTE — TELEPHONE ENCOUNTER
---------------------  From: Andra Caballero CMA   To: Appointment Pool ([id number]);     Sent: 10/12/2021 2:08:18 PM CDT  Subject: Curbside     Please add to curbsidept added

## 2022-02-15 NOTE — TELEPHONE ENCOUNTER
---------------------  From: Irma Yoder CMA   Sent: 10/14/2021 2:09:09 PM CDT  Subject: General Message-test results     Phone Message    PCP:   _      Time of Call:  1400       Person Calling:  self      Note:   pt calling to get a copy of his RST and negative covid results emailed to him.  Advised that we need him to sign SARAVANAN.  We can email it to him or he can stop in to p/u since both tests negative.  He will stop in, copies left at [facility] and they will have him sign SARAVANAN    Last office visit and reason:  10/12

## 2022-07-24 ENCOUNTER — HEALTH MAINTENANCE LETTER (OUTPATIENT)
Age: 37
End: 2022-07-24

## 2022-10-02 ENCOUNTER — HEALTH MAINTENANCE LETTER (OUTPATIENT)
Age: 37
End: 2022-10-02

## 2023-03-22 ENCOUNTER — HOSPITAL ENCOUNTER (EMERGENCY)
Facility: CLINIC | Age: 38
Discharge: HOME | End: 2023-03-23
Attending: EMERGENCY MEDICINE | Admitting: EMERGENCY MEDICINE
Payer: COMMERCIAL

## 2023-03-22 DIAGNOSIS — R19.7 VOMITING AND DIARRHEA: ICD-10-CM

## 2023-03-22 DIAGNOSIS — R11.10 VOMITING AND DIARRHEA: ICD-10-CM

## 2023-03-22 DIAGNOSIS — Z78.9 ALCOHOL USE: ICD-10-CM

## 2023-03-22 DIAGNOSIS — K29.20 ACUTE ALCOHOLIC GASTRITIS WITHOUT HEMORRHAGE: ICD-10-CM

## 2023-03-22 LAB
ALBUMIN SERPL BCG-MCNC: 4.3 G/DL (ref 3.5–5.2)
ALP SERPL-CCNC: 50 U/L (ref 40–129)
ALT SERPL W P-5'-P-CCNC: 56 U/L (ref 10–50)
ANION GAP SERPL CALCULATED.3IONS-SCNC: 13 MMOL/L (ref 7–15)
AST SERPL W P-5'-P-CCNC: 71 U/L (ref 10–50)
BASOPHILS # BLD AUTO: 0 10E3/UL (ref 0–0.2)
BASOPHILS NFR BLD AUTO: 1 %
BILIRUB SERPL-MCNC: 0.4 MG/DL
BUN SERPL-MCNC: 10.6 MG/DL (ref 6–20)
CALCIUM SERPL-MCNC: 9.5 MG/DL (ref 8.6–10)
CHLORIDE SERPL-SCNC: 100 MMOL/L (ref 98–107)
CREAT SERPL-MCNC: 1.13 MG/DL (ref 0.67–1.17)
DEPRECATED HCO3 PLAS-SCNC: 26 MMOL/L (ref 22–29)
EOSINOPHIL # BLD AUTO: 0 10E3/UL (ref 0–0.7)
EOSINOPHIL NFR BLD AUTO: 1 %
ERYTHROCYTE [DISTWIDTH] IN BLOOD BY AUTOMATED COUNT: 12.8 % (ref 10–15)
ETHANOL SERPL-MCNC: 0.06 G/DL
FLUAV RNA SPEC QL NAA+PROBE: NEGATIVE
FLUBV RNA RESP QL NAA+PROBE: NEGATIVE
GFR SERPL CREATININE-BSD FRML MDRD: 85 ML/MIN/1.73M2
GLUCOSE SERPL-MCNC: 96 MG/DL (ref 70–99)
HCT VFR BLD AUTO: 42.3 % (ref 40–53)
HGB BLD-MCNC: 14.4 G/DL (ref 13.3–17.7)
HOLD SPECIMEN: NORMAL
HOLD SPECIMEN: NORMAL
IMM GRANULOCYTES # BLD: 0 10E3/UL
IMM GRANULOCYTES NFR BLD: 0 %
LYMPHOCYTES # BLD AUTO: 3 10E3/UL (ref 0.8–5.3)
LYMPHOCYTES NFR BLD AUTO: 54 %
MCH RBC QN AUTO: 32.3 PG (ref 26.5–33)
MCHC RBC AUTO-ENTMCNC: 34 G/DL (ref 31.5–36.5)
MCV RBC AUTO: 95 FL (ref 78–100)
MONOCYTES # BLD AUTO: 0.5 10E3/UL (ref 0–1.3)
MONOCYTES NFR BLD AUTO: 9 %
NEUTROPHILS # BLD AUTO: 1.9 10E3/UL (ref 1.6–8.3)
NEUTROPHILS NFR BLD AUTO: 35 %
NRBC # BLD AUTO: 0 10E3/UL
NRBC BLD AUTO-RTO: 0 /100
PLATELET # BLD AUTO: 204 10E3/UL (ref 150–450)
POTASSIUM SERPL-SCNC: 3.8 MMOL/L (ref 3.4–5.3)
PROT SERPL-MCNC: 8.4 G/DL (ref 6.4–8.3)
RBC # BLD AUTO: 4.46 10E6/UL (ref 4.4–5.9)
RSV RNA SPEC NAA+PROBE: NEGATIVE
SARS-COV-2 RNA RESP QL NAA+PROBE: NEGATIVE
SODIUM SERPL-SCNC: 139 MMOL/L (ref 136–145)
WBC # BLD AUTO: 5.4 10E3/UL (ref 4–11)

## 2023-03-22 PROCEDURE — 82077 ASSAY SPEC XCP UR&BREATH IA: CPT | Performed by: EMERGENCY MEDICINE

## 2023-03-22 PROCEDURE — 99284 EMERGENCY DEPT VISIT MOD MDM: CPT | Mod: CS,25

## 2023-03-22 PROCEDURE — 87637 SARSCOV2&INF A&B&RSV AMP PRB: CPT | Performed by: EMERGENCY MEDICINE

## 2023-03-22 PROCEDURE — 36415 COLL VENOUS BLD VENIPUNCTURE: CPT | Performed by: EMERGENCY MEDICINE

## 2023-03-22 PROCEDURE — 85025 COMPLETE CBC W/AUTO DIFF WBC: CPT | Performed by: EMERGENCY MEDICINE

## 2023-03-22 PROCEDURE — 80053 COMPREHEN METABOLIC PANEL: CPT | Performed by: EMERGENCY MEDICINE

## 2023-03-22 PROCEDURE — 250N000011 HC RX IP 250 OP 636: Performed by: EMERGENCY MEDICINE

## 2023-03-22 PROCEDURE — C9803 HOPD COVID-19 SPEC COLLECT: HCPCS

## 2023-03-22 PROCEDURE — 258N000003 HC RX IP 258 OP 636: Performed by: EMERGENCY MEDICINE

## 2023-03-22 PROCEDURE — 96361 HYDRATE IV INFUSION ADD-ON: CPT

## 2023-03-22 PROCEDURE — 96374 THER/PROPH/DIAG INJ IV PUSH: CPT

## 2023-03-22 RX ORDER — ONDANSETRON 2 MG/ML
4 INJECTION INTRAMUSCULAR; INTRAVENOUS ONCE
Status: COMPLETED | OUTPATIENT
Start: 2023-03-22 | End: 2023-03-22

## 2023-03-22 RX ADMIN — ONDANSETRON 4 MG: 2 INJECTION INTRAMUSCULAR; INTRAVENOUS at 22:30

## 2023-03-22 RX ADMIN — SODIUM CHLORIDE 1000 ML: 9 INJECTION, SOLUTION INTRAVENOUS at 22:30

## 2023-03-22 ASSESSMENT — ENCOUNTER SYMPTOMS
BLOOD IN STOOL: 0
VOMITING: 1
DIARRHEA: 1
NAUSEA: 1
RHINORRHEA: 0
SORE THROAT: 0

## 2023-03-22 ASSESSMENT — ACTIVITIES OF DAILY LIVING (ADL): ADLS_ACUITY_SCORE: 33

## 2023-03-22 NOTE — LETTER
March 23, 2023      To Whom It May Concern:      Blake Degroot was seen in our Emergency Department today, 03/23/23.  I expect his condition to improve over the next 3 days.  He may return to work/school when improved.    Sincerely,        Angel iZmmerman RN

## 2023-03-23 VITALS
HEART RATE: 84 BPM | SYSTOLIC BLOOD PRESSURE: 154 MMHG | DIASTOLIC BLOOD PRESSURE: 99 MMHG | TEMPERATURE: 99.1 F | RESPIRATION RATE: 18 BRPM | OXYGEN SATURATION: 100 %

## 2023-03-23 NOTE — ED PROVIDER NOTES
History     Chief Complaint:  Nausea & Vomiting     The history is provided by the patient.      Blake Degroot is a 37 year old male with a history of anxiety, depression, hypertension, tobacco use disorder, chronic chest pain and headache who presents with nausea, vomiting, and diarrhea for the past few days. Patient was diagnosed with gastroenteritis at [facility] four days ago and was placed on anti-nausea medication. Patient denies sore throat, rhinorrhea, blood in stool, or urinary symptoms. He notes it is hard to keep anything down. He has been eating crackers and soup. Patient notes he smokes cigarettes. He notes he is an alcohol drinker. He denies drug use. He denies recent antibiotic use. Patient notes he was drinking alcohol.     Independent Historian:   None - Patient Only      ROS:  Review of Systems   HENT: Negative for rhinorrhea and sore throat.    Gastrointestinal: Positive for diarrhea, nausea and vomiting. Negative for blood in stool.   Genitourinary: Negative.    All other systems reviewed and are negative.    Allergies:  Amitriptyline  Ibuprofen  Imitrex [Sumatriptan]   Atenolol    Medications:    Albuterol inhaler  Fluticasone propionate inhaler  Lisinopril   Ondansetron     Past Medical History:    Anxiety  Depression  Hypertension   Tobacco use disorder  Chronic headache  Chronic chest pain   Head injury  Community acquired pneumonia, bilateral  Mild intermittent reactive airway disease without complication    Social History:  Patient presents with spouse.  Patient arrived via car.     Physical Exam     Patient Vitals for the past 24 hrs:   BP Temp Temp src Pulse Resp SpO2   03/22/23 2324 (!) 162/95 -- -- 94 -- 100 %   03/22/23 2315 -- -- -- -- -- 100 %   03/22/23 2222 (!) 178/116 99.1  F (37.3  C) Oral 101 18 100 %      Physical Exam  Constitutional: Vital signs reviewed as above  General: Alert, pleasant  HEENT: Moist mucous membranes  Eyes: Pupils are equal, round, and reactive to  light.   Neck: Normal range of motion  Cardiovascular: normal rate, Regular rhythm and normal heart sounds.  No MRG  Pulmonary/Chest: Effort normal and breath sounds normal. No respiratory distress. Patient has no wheezes. Patient has no rales.   Gastrointestinal: Soft. Positive bowel sounds. No MRG.  Musculoskeletal/Extremities: Full ROM.  Endo: No pitting edema  Neurological: Alert, no focal deficits.  Skin: Skin is warm and dry.   Psychiatric: Pleasant    Emergency Department Course     Laboratory:  Labs Ordered and Resulted from Time of ED Arrival to Time of ED Departure   COMPREHENSIVE METABOLIC PANEL - Abnormal       Result Value    Sodium 139      Potassium 3.8      Chloride 100      Carbon Dioxide (CO2) 26      Anion Gap 13      Urea Nitrogen 10.6      Creatinine 1.13      Calcium 9.5      Glucose 96      Alkaline Phosphatase 50      AST 71 (*)     ALT 56 (*)     Protein Total 8.4 (*)     Albumin 4.3      Bilirubin Total 0.4      GFR Estimate 85     ETHYL ALCOHOL LEVEL - Abnormal    Alcohol ethyl 0.06 (*)    INFLUENZA A/B, RSV, & SARS-COV2 PCR - Normal    Influenza A PCR Negative      Influenza B PCR Negative      RSV PCR Negative      SARS CoV2 PCR Negative     CBC WITH PLATELETS AND DIFFERENTIAL    WBC Count 5.4      RBC Count 4.46      Hemoglobin 14.4      Hematocrit 42.3      MCV 95      MCH 32.3      MCHC 34.0      RDW 12.8      Platelet Count 204      % Neutrophils 35      % Lymphocytes 54      % Monocytes 9      % Eosinophils 1      % Basophils 1      % Immature Granulocytes 0      NRBCs per 100 WBC 0      Absolute Neutrophils 1.9      Absolute Lymphocytes 3.0      Absolute Monocytes 0.5      Absolute Eosinophils 0.0      Absolute Basophils 0.0      Absolute Immature Granulocytes 0.0      Absolute NRBCs 0.0        Emergency Department Course & Assessments:    Interventions:  Medications   0.9% sodium chloride BOLUS (0 mLs Intravenous Stopped 3/22/23 3324)   ondansetron (ZOFRAN) injection 4 mg (4 mg  Intravenous $Given 3/22/23 2230)      Assessments/Consultations/Discussion of Management or Tests:  ED Course as of 03/22/23 2344   Wed Mar 22, 2023   2311 I obtained patient's history and examined as noted above.    2342 I rechecked the patient and explained findings.      Social Determinants of Health affecting care:   Cigarette smoker  Continues to drink alcohol    Disposition:  The patient was discharged to home.     Impression & Plan      Medical Decision Making:  Patient presents to the ER 4 days after being seen at [facility] for likely viral gastroenteritis.  He was sent home with Zofran.  He states that he is unable to keep anything down despite the Zofran.  He states that he has been trying bland foods.  He does smoke cigarettes.  He also admitted that he has been drinking some alcohol.  Blood alcohol level here is 0.06.  The rest of his basic labs are unremarkable aside from some mildly elevated LFTs.  Influenza, COVID, RSV are negative.  He received fluids and Zofran here and is tolerating p.o.  I discussed that this is likely alcoholic gastritis and it be very important for him to quit drinking alcohol.  He was surprised to learn that his blood alcohol level was 0.06.  He knows that this is the main contributing factor to his continued vomiting.  He will continue with fluids and Zofran at home.    Diagnosis:    ICD-10-CM    1. Vomiting and diarrhea  R11.10     R19.7       2. Alcohol use  Z78.9       3. Acute alcoholic gastritis without hemorrhage  K29.20          Scribe Disclosure:  I, Basia Klarissa, am serving as a scribe at 11:04 PM on 3/22/2023 to document services personally performed by Maycol Echeverria MD based on my observations and the provider's statements to me.     3/22/2023   Maycol Echeverria MD Walters, Brent Aaron, MD  03/22/23 6733

## 2023-03-23 NOTE — ED TRIAGE NOTES
Pt presents with vomiting.   Was in UC on 3/18, dx with stomach virus. Symptoms have not improved. Vomiting at work tonight.   Last ODT Zofran 1730

## 2023-05-03 ENCOUNTER — OFFICE VISIT (OUTPATIENT)
Dept: URGENT CARE | Facility: URGENT CARE | Age: 38
End: 2023-05-03
Payer: COMMERCIAL

## 2023-05-03 VITALS
TEMPERATURE: 98.9 F | DIASTOLIC BLOOD PRESSURE: 83 MMHG | SYSTOLIC BLOOD PRESSURE: 132 MMHG | OXYGEN SATURATION: 100 % | HEART RATE: 77 BPM

## 2023-05-03 DIAGNOSIS — L03.119 CELLULITIS AND ABSCESS OF LEG: Primary | ICD-10-CM

## 2023-05-03 DIAGNOSIS — L02.419 CELLULITIS AND ABSCESS OF LEG: Primary | ICD-10-CM

## 2023-05-03 PROCEDURE — 99213 OFFICE O/P EST LOW 20 MIN: CPT | Performed by: PHYSICIAN ASSISTANT

## 2023-05-03 RX ORDER — CEPHALEXIN 500 MG/1
500 CAPSULE ORAL 4 TIMES DAILY
Qty: 28 CAPSULE | Refills: 0 | Status: SHIPPED | OUTPATIENT
Start: 2023-05-03 | End: 2023-05-10

## 2023-05-03 NOTE — PROGRESS NOTES
Assessment & Plan     1. Cellulitis and abscess of leg  Examination of right groin region is consistent with cellulitis and abscess with warmth, tenderness, swelling and fluctuance.  Offered incision and drainage today, and patient declines.  Encouraged him to use warm compresses, Tylenol for pain.  Take antibiotic as directed, he does not have history of MRSA.  Discussed indications for follow-up  - cephALEXin (KEFLEX) 500 MG capsule; Take 1 capsule (500 mg) by mouth 4 times daily for 7 days  Dispense: 28 capsule; Refill: 0        Return in about 3 days (around 5/6/2023), or if symptoms worsen or fail to improve.    Diagnosis and treatment plan was reviewed with patient and/or family.   We went over any labs or imaging. Discussed worsening symptoms or little to no relief despite treatment plan to follow-up with PCP or return to clinic.  Patient verbalizes understanding. All questions were addressed and answered.     Shira Lopez PA-C  [facility]    CHIEF COMPLAINT:   Chief Complaint   Patient presents with     Urgent Care     Open sore on R thigh- he says it was a cyst that ruptured     Subjective     Blake is a 37 year old male who presents to clinic today for evaluation of skin lesion.  Had one on his buttock which on 4/30/2023, ruptured at work.  He has had some oozing since that time.  Now, has a cyst located in his right groin region which is tender.  He has a history of cyst/boils in the past, and this is a typical pattern for him    Patient denies having fever, chills, numbness, tingling, pale or cold extremity.       Past Medical History:   Diagnosis Date     Anxiety      Depression      Hypertension      No past surgical history on file.  Social History     Tobacco Use     Smoking status: Every Day     Packs/day: 0.00     Types: Cigarettes     Smokeless tobacco: Never   Vaping Use     Vaping status: Not on file   Substance Use Topics     Alcohol use: Yes     Comment:  "Alcoholic Drinks/day: occasional     Current Outpatient Medications   Medication     albuterol (PROAIR HFA;PROVENTIL HFA;VENTOLIN HFA) 90 mcg/actuation inhaler     cephALEXin (KEFLEX) 500 MG capsule     fluticasone propionate (FLOVENT HFA) 44 mcg/actuation inhaler     lisinopril (PRINIVIL,ZESTRIL) 10 MG tablet     No current facility-administered medications for this visit.     Allergies   Allergen Reactions     Amitriptyline Unknown     \"Was tired\"      Ibuprofen Unknown     Upset stomach if he has not eaten.     Imitrex [Sumatriptan] Unknown       10 point ROS of systems were all negative except for pertinent positives noted in my HPI.      Exam:   /83   Pulse 77   Temp 98.9  F (37.2  C) (Tympanic)   SpO2 100%   Gen: healthy, alert, no distress  Extremity: 1) Buttock has open, draining abscess, with scant purulent drainage.  No surrounding erythema.  It is tender to the touch.  No warmth noted.    2) right groin has erythema, warmth, tenderness to palpation and fluctuance.  No drainage noted.    NEURO: Normal strength and tone, sensory exam grossly normal, mentation intact and speech normal    No results found for any visits on 05/03/23.        "

## 2023-05-03 NOTE — PATIENT INSTRUCTIONS
Start taking the antibiotics four times per day for one week  Use warm compresses on the area  Tylenol for pain

## 2023-05-03 NOTE — LETTER
May 3, 2023      Blake Degroot  [id number]  [place] [place] [facility] [place] [id number]        To Whom It May Concern:    Blake Degroot was seen in our clinic. Please excuse from missed work on 4/30/2023.       Sincerely,        Shira Lopez PA-C

## 2023-08-12 ENCOUNTER — HEALTH MAINTENANCE LETTER (OUTPATIENT)
Age: 38
End: 2023-08-12

## 2024-10-05 ENCOUNTER — HEALTH MAINTENANCE LETTER (OUTPATIENT)
Age: 39
End: 2024-10-05

## 2024-10-06 ENCOUNTER — NURSE TRIAGE (OUTPATIENT)
Dept: NURSING | Facility: CLINIC | Age: 39
End: 2024-10-06
Payer: COMMERCIAL
